# Patient Record
Sex: MALE | Race: WHITE | Employment: UNEMPLOYED | URBAN - METROPOLITAN AREA
[De-identification: names, ages, dates, MRNs, and addresses within clinical notes are randomized per-mention and may not be internally consistent; named-entity substitution may affect disease eponyms.]

---

## 2017-02-10 ENCOUNTER — OFFICE VISIT (OUTPATIENT)
Dept: PEDIATRICS | Facility: CLINIC | Age: 42
End: 2017-02-10
Payer: COMMERCIAL

## 2017-02-10 VITALS
SYSTOLIC BLOOD PRESSURE: 110 MMHG | RESPIRATION RATE: 14 BRPM | WEIGHT: 223.9 LBS | DIASTOLIC BLOOD PRESSURE: 80 MMHG | HEART RATE: 60 BPM | TEMPERATURE: 98 F | HEIGHT: 71 IN | BODY MASS INDEX: 31.35 KG/M2

## 2017-02-10 DIAGNOSIS — K51.819 OTHER ULCERATIVE COLITIS WITH COMPLICATION (H): ICD-10-CM

## 2017-02-10 DIAGNOSIS — S46.811A STRAIN OF RIGHT DELTOID MUSCLE, INITIAL ENCOUNTER: ICD-10-CM

## 2017-02-10 DIAGNOSIS — Z00.00 ENCOUNTER FOR ROUTINE ADULT HEALTH EXAMINATION WITHOUT ABNORMAL FINDINGS: Primary | ICD-10-CM

## 2017-02-10 DIAGNOSIS — F10.21 ALCOHOL DEPENDENCE IN REMISSION (H): ICD-10-CM

## 2017-02-10 DIAGNOSIS — Z13.6 CARDIOVASCULAR SCREENING; LDL GOAL LESS THAN 160: ICD-10-CM

## 2017-02-10 DIAGNOSIS — I10 ESSENTIAL HYPERTENSION: ICD-10-CM

## 2017-02-10 DIAGNOSIS — Z72.0 TOBACCO ABUSE: ICD-10-CM

## 2017-02-10 PROCEDURE — 99396 PREV VISIT EST AGE 40-64: CPT | Performed by: INTERNAL MEDICINE

## 2017-02-10 ASSESSMENT — ANXIETY QUESTIONNAIRES
5. BEING SO RESTLESS THAT IT IS HARD TO SIT STILL: NOT AT ALL
GAD7 TOTAL SCORE: 2
2. NOT BEING ABLE TO STOP OR CONTROL WORRYING: NOT AT ALL
6. BECOMING EASILY ANNOYED OR IRRITABLE: NOT AT ALL
7. FEELING AFRAID AS IF SOMETHING AWFUL MIGHT HAPPEN: NOT AT ALL
IF YOU CHECKED OFF ANY PROBLEMS ON THIS QUESTIONNAIRE, HOW DIFFICULT HAVE THESE PROBLEMS MADE IT FOR YOU TO DO YOUR WORK, TAKE CARE OF THINGS AT HOME, OR GET ALONG WITH OTHER PEOPLE: SOMEWHAT DIFFICULT
1. FEELING NERVOUS, ANXIOUS, OR ON EDGE: SEVERAL DAYS
3. WORRYING TOO MUCH ABOUT DIFFERENT THINGS: SEVERAL DAYS

## 2017-02-10 ASSESSMENT — PATIENT HEALTH QUESTIONNAIRE - PHQ9: 5. POOR APPETITE OR OVEREATING: NOT AT ALL

## 2017-02-10 NOTE — MR AVS SNAPSHOT
After Visit Summary   2/10/2017    Roger Avelar    MRN: [id number]           Patient Information     Date Of Birth          1975        Visit Information        Provider Department      2/10/2017 10:00 AM Sebastián Kat MD [facility]        Today's Diagnoses     Encounter for routine adult health examination without abnormal findings    -  1     Essential hypertension         Other ulcerative colitis with complication (H)         Alcohol dependence in remission (H)         Strain of right deltoid muscle, initial encounter         Tobacco abuse         CARDIOVASCULAR SCREENING; LDL GOAL LESS THAN 160           Care Instructions      Preventive Health Recommendations  Male Ages 40 to 49    Yearly exam:             See your health care provider every year in order to  o   Review health changes.   o   Discuss preventive care.    o   Review your medicines if your doctor has prescribed any.    You should be tested each year for STDs (sexually transmitted diseases) if you re at risk.     Have a cholesterol test every 5 years.     Have a colonoscopy (test for colon cancer) if someone in your family has had colon cancer or polyps before age 50.     After age 45, have a diabetes test (fasting glucose). If you are at risk for diabetes, you should have this test every 3 years.      Talk with your health care provider about whether or not a prostate cancer screening test (PSA) is right for you.    Shots: Get a flu shot each year. Get a tetanus shot every 10 years.     Nutrition:    Eat at least 5 servings of fruits and vegetables daily.     Eat whole-grain bread, whole-wheat pasta and brown rice instead of white grains and rice.     Talk to your provider about Calcium and Vitamin D.     Lifestyle    Exercise for at least 150 minutes a week (30 minutes a day, 5 days a week). This will help you control your weight and prevent disease.     Limit alcohol to one drink per day.     No smoking.  "    Wear sunscreen to prevent skin cancer.     See your dentist every six months for an exam and cleaning.                Follow-ups after your visit        Future tests that were ordered for you today     Open Future Orders        Priority Expected Expires Ordered    Lipid Profile with reflex to direct LDL Routine  12/10/2017 2/10/2017    Comprehensive metabolic panel (BMP + Alb, Alk Phos, ALT, AST, Total. Bili, TP) Routine  12/10/2017 2/10/2017            Who to contact     If you have questions or need follow up information about today's clinic visit or your schedule please contact [facility] directly at [phone].  Normal or non-critical lab and imaging results will be communicated to you by MyChart, letter or phone within 4 business days after the clinic has received the results. If you do not hear from us within 7 days, please contact the clinic through [employer] or phone. If you have a critical or abnormal lab result, we will notify you by phone as soon as possible.  Submit refill requests through [employer] or call your pharmacy and they will forward the refill request to us. Please allow 3 business days for your refill to be completed.          Additional Information About Your Visit        [employer] Information     [employer] lets you send messages to your doctor, view your test results, renew your prescriptions, schedule appointments and more. To sign up, go to [email or web address] . Click on \"Log in\" on the left side of the screen, which will take you to the Welcome page. Then click on \"Sign up Now\" on the right side of the page.     You will be asked to enter the access code listed below, as well as some personal information. Please follow the directions to create your username and password.     Your access code is: [email or web address]  Expires: 2017 10:33 AM     Your access code will  in 90 days. If you need help or a new code, please call your [place] clinic or [phone].        Care " "EveryWhere ID     This is your Care EveryWhere ID. This could be used by other organizations to access your [facility] medical records  [id number]        Your Vitals Were     Pulse Temperature Respirations Height BMI (Body Mass Index)       60 98  F (36.7  C) (Oral) 14 5' 11\" (1.803 m) 31.24 kg/m2        Blood Pressure from Last 3 Encounters:   02/10/17 110/80   04/09/15 139/87   11/26/14 140/82    Weight from Last 3 Encounters:   02/10/17 223 lb 14.4 oz (101.56 kg)   04/09/15 235 lb (106.595 kg)   11/26/14 242 lb (109.77 kg)                 Today's Medication Changes          These changes are accurate as of: 2/10/17 10:33 AM.  If you have any questions, ask your nurse or doctor.               Stop taking these medicines if you haven't already. Please contact your care team if you have questions.     fluticasone 50 MCG/ACT spray   Commonly known as:  FLONASE   Stopped by:  Sebastián Kat MD           LORazepam 1 MG tablet   Commonly known as:  ATIVAN   Stopped by:  Sebastián Kat MD           Multi-vitamin Tabs tablet   Stopped by:  Sebastián Kat MD           OMEGA-3 FISH OIL PO   Stopped by:  Sebastián Kat MD                    Primary Care Provider Office Phone # Fax #    Sebastián Kat [phone]       [facility] [address] [place] [address] [address] [place] [address]        Thank you!     Thank you for choosing [facility]  for your care. Our goal is always to provide you with excellent care. Hearing back from our patients is one way we can continue to improve our services. Please take a few minutes to complete the written survey that you may receive in the mail after your visit with us. Thank you!             Your Updated Medication List - Protect others around you: Learn how to safely use, store and throw away your medicines at www.disposemymeds.org.      Notice  As of 2/10/2017 10:33 AM    You have not been prescribed any medications.      "

## 2017-02-10 NOTE — NURSING NOTE
"Chief Complaint   Patient presents with     Physical       Initial /80 mmHg  Pulse 60  Temp(Src) 98  F (36.7  C) (Oral)  Resp 14  Ht 5' 11\" (1.803 m)  Wt 223 lb 14.4 oz (101.56 kg)  BMI 31.24 kg/m2 Estimated body mass index is 31.24 kg/(m^2) as calculated from the following:    Height as of this encounter: 5' 11\" (1.803 m).    Weight as of this encounter: 223 lb 14.4 oz (101.56 kg).  Medication Reconciliation: complete   Juanita ASH, CMA,AAMA      "

## 2017-02-10 NOTE — PATIENT INSTRUCTIONS
Preventive Health Recommendations  Male Ages 40 to 49    Yearly exam:             See your health care provider every year in order to  o   Review health changes.   o   Discuss preventive care.    o   Review your medicines if your doctor has prescribed any.    You should be tested each year for STDs (sexually transmitted diseases) if you re at risk.     Have a cholesterol test every 5 years.     Have a colonoscopy (test for colon cancer) if someone in your family has had colon cancer or polyps before age 50.     After age 45, have a diabetes test (fasting glucose). If you are at risk for diabetes, you should have this test every 3 years.      Talk with your health care provider about whether or not a prostate cancer screening test (PSA) is right for you.    Shots: Get a flu shot each year. Get a tetanus shot every 10 years.     Nutrition:    Eat at least 5 servings of fruits and vegetables daily.     Eat whole-grain bread, whole-wheat pasta and brown rice instead of white grains and rice.     Talk to your provider about Calcium and Vitamin D.     Lifestyle    Exercise for at least 150 minutes a week (30 minutes a day, 5 days a week). This will help you control your weight and prevent disease.     Limit alcohol to one drink per day.     No smoking.     Wear sunscreen to prevent skin cancer.     See your dentist every six months for an exam and cleaning.

## 2017-02-10 NOTE — PROGRESS NOTES
SUBJECTIVE:     CC: Roger Avelar is an 41 year old male who presents for preventative health visit.     Healthy Habits:    Do you get at least three servings of calcium containing foods daily (dairy, green leafy vegetables, etc.)? yes    Amount of exercise or daily activities, outside of work: 5-7 day(s) per week    Problems taking medications regularly not applicable    Medication side effects: Not applicable    Have you had an eye exam in the past two years? yes    Do you see a dentist twice per year? yes    Do you have sleep apnea, excessive snoring or daytime drowsiness?no      patient here for follow-up, doing very well. has been sober for > 2 years, feels great and has lost a lot of weight. feels better than he has in a long time. has some shoulder pain, alexia with certain activities at the gym. no injury he has noted. Ulcerative colitis is stable, does have GI follow-up annually. Pt also quit smoking!    Today's PHQ-2 Score: 0  PHQ-2 ( 1999 Pf[employer]) 7/9/2014 3/17/2014   Q1: Little interest or pleasure in doing things 0 0   Q2: Feeling down, depressed or hopeless 0 0   PHQ-2 Score 0 0       Abuse: Current or Past(Physical, Sexual or Emotional)- No  Do you feel safe in your environment - Yes    Social History   Substance Use Topics     Smoking status: Light Tobacco Smoker -- 0.50 packs/day     Types: Cigarettes     Smokeless tobacco: Never Used      Comment: 4 cigs a day     Alcohol Use: Yes      Comment: 8 drinks daily     The patient does not drink >3 drinks per day nor >7 drinks per week.    Last PSA: No results found for: PSA    Recent Labs   Lab Test  09/09/13   1117   CHOL  147   HDL  68   LDL  45   TRIG  169*   CHOLHDLRATIO  2.2       Reviewed orders with patient. Reviewed health maintenance and updated orders accordingly - Yes    All Histories reviewed and updated in Epic.      ROS:  C: NEGATIVE for fever, chills, change in weight  I: NEGATIVE for worrisome rashes, moles or lesions  E: NEGATIVE for  "vision changes or irritation  ENT: NEGATIVE for ear, mouth and throat problems  R: NEGATIVE for significant cough or SOB  CV: NEGATIVE for chest pain, palpitations or peripheral edema  GI: NEGATIVE for nausea, abdominal pain, heartburn, or change in bowel habits   male: negative for dysuria, hematuria, decreased urinary stream, erectile dysfunction, urethral discharge  M: NEGATIVE for significant arthralgias or myalgia  N: NEGATIVE for weakness, dizziness or paresthesias  P: NEGATIVE for changes in mood or affect    Problem list, Medication list, Allergies, and Medical/Social/Surgical histories reviewed in [facility] and updated as appropriate.  OBJECTIVE:     /80 mmHg  Pulse 60  Temp(Src) 98  F (36.7  C) (Oral)  Resp 14  Ht 5' 11\" (1.803 m)  Wt 223 lb 14.4 oz (101.56 kg)  BMI 31.24 kg/m2     Wt Readings from Last 4 Encounters:   02/10/17 223 lb 14.4 oz (101.6 kg)   04/09/15 235 lb (106.6 kg)   11/26/14 242 lb (109.8 kg)   07/21/14 240 lb (108.9 kg)       EXAM:  GENERAL: healthy, alert and no distress  EYES: Eyes grossly normal to inspection, PERRL and conjunctivae and sclerae normal  HENT: ear canals and TM's normal, nose and mouth without ulcers or lesions  NECK: no adenopathy, no asymmetry, masses, or scars and thyroid normal to palpation  RESP: lungs clear to auscultation - no rales, rhonchi or wheezes  CV: regular rate and rhythm, normal S1 S2, no S3 or S4, no murmur, click or rub, no peripheral edema and peripheral pulses strong  ABDOMEN: soft, nontender, no hepatosplenomegaly, no masses and bowel sounds normal  MS: + anterior deltaid pain with SITS mm testing. full range of motion. normal strength.   SKIN: no suspicious lesions or rashes  NEURO: Normal strength and tone, mentation intact and speech normal  PSYCH: mentation appears normal, affect normal/bright    ASSESSMENT/PLAN:     1. Encounter for routine adult health examination without abnormal findings  d/w pt preventative care measures including " seat belt use, bike helmet, moderation of EtOH, avoiding tobacco, avoiding excessive sun exposure/sunscreen, wt management or wt loss if BMI > 30, need to screen for lipid disorders, mood disorders, CAD risk factors, etc. Also discussed accident prevention and future RHM schedule.   - Lipid Profile with reflex to direct LDL; Future    2. Essential hypertension  discussed with patient (or patient's parents/caregiver) pathophysiology of condition and treatment options.  reviewed labs, medications, diet ,etc.    - Lipid Profile with reflex to direct LDL; Future  - Comprehensive metabolic panel (BMP + Alb, Alk Phos, ALT, AST, Total. Bili, TP); Future    3. Other ulcerative colitis with complication (H)  Well controlled on current medication(s). discussed with patient (or patient's parents/caregiver) pathophysiology of condition and treatment options.  reviewed labs, medications, diet ,etc.      4. Alcohol dependence in remission (H)  discussed with patient (or patient's parents/caregiver) pathophysiology of condition and treatment options.  congrats on sobriety. reviwed 1`2 step mtgs, mindfulness, etc.   - Comprehensive metabolic panel (BMP + Alb, Alk Phos, ALT, AST, Total. Bili, TP); Future    5. Strain of right deltoid muscle, initial encounter  Rest the affected painful area as much as possible. Avoid aggravating activities. Apply ice for 15-20 minutes intermittently as needed and especially after any offending activity. Ibuprophen at anti-inflammatory dose x 3 days with food, then prn. Daily stretching.  As pain recedes, begin normal activities slowly as tolerated.  Consider Physical Therapy if symptoms not better with symptomatic care. Patient verbalized understanding and is agreeable to this plan.     6. Tobacco abuse  congrats on cessation!    7. CARDIOVASCULAR SCREENING; LDL GOAL LESS THAN 160  - Lipid Profile with reflex to direct LDL; Future    COUNSELING:  Reviewed preventive health counseling, as reflected  "in patient instructions  Special attention given to:        Regular exercise       Healthy diet/nutrition       Vision screening       Hearing screening       Colon cancer screening       Prostate cancer screening         reports that he has been smoking Cigarettes.  He has been smoking about 0.50 packs per day. He has never used smokeless tobacco.    Estimated body mass index is 31.24 kg/(m^2) as calculated from the following:    Height as of this encounter: 5' 11\" (1.803 m).    Weight as of this encounter: 223 lb 14.4 oz (101.56 kg).       Counseling Resources:  ATP IV Guidelines  Pooled Cohorts Equation Calculator  FRAX Risk Assessment  ICSI Preventive Guidelines  Dietary Guidelines for Americans, 2010  Epoch's MyPlate  ASA Prophylaxis  Lung CA Screening    I have discussed with patient the risks, benefits, medications, treatment options and modalities.   I have instructed the patient to call or schedule a follow-up appointment if any problems or failure to improve.       Sebastián Kat MD  [facility]  "

## 2017-02-11 ASSESSMENT — ANXIETY QUESTIONNAIRES: GAD7 TOTAL SCORE: 2

## 2017-02-11 ASSESSMENT — PATIENT HEALTH QUESTIONNAIRE - PHQ9: SUM OF ALL RESPONSES TO PHQ QUESTIONS 1-9: 0

## 2017-02-14 DIAGNOSIS — Z00.00 ENCOUNTER FOR ROUTINE ADULT HEALTH EXAMINATION WITHOUT ABNORMAL FINDINGS: ICD-10-CM

## 2017-02-14 DIAGNOSIS — Z13.6 CARDIOVASCULAR SCREENING; LDL GOAL LESS THAN 160: ICD-10-CM

## 2017-02-14 DIAGNOSIS — I10 ESSENTIAL HYPERTENSION: ICD-10-CM

## 2017-02-14 DIAGNOSIS — F10.21 ALCOHOL DEPENDENCE IN REMISSION (H): ICD-10-CM

## 2017-02-14 LAB
ALBUMIN SERPL-MCNC: 3.7 G/DL (ref 3.4–5)
ALP SERPL-CCNC: 84 U/L (ref 40–150)
ALT SERPL W P-5'-P-CCNC: 236 U/L (ref 0–70)
ANION GAP SERPL CALCULATED.3IONS-SCNC: 6 MMOL/L (ref 3–14)
AST SERPL W P-5'-P-CCNC: 88 U/L (ref 0–45)
BILIRUB SERPL-MCNC: 0.5 MG/DL (ref 0.2–1.3)
BUN SERPL-MCNC: 17 MG/DL (ref 7–30)
CALCIUM SERPL-MCNC: 8.7 MG/DL (ref 8.5–10.1)
CHLORIDE SERPL-SCNC: 106 MMOL/L (ref 94–109)
CHOLEST SERPL-MCNC: 173 MG/DL
CO2 SERPL-SCNC: 28 MMOL/L (ref 20–32)
CREAT SERPL-MCNC: 0.9 MG/DL (ref 0.66–1.25)
GFR SERPL CREATININE-BSD FRML MDRD: ABNORMAL ML/MIN/1.7M2
GLUCOSE SERPL-MCNC: 98 MG/DL (ref 70–99)
HDLC SERPL-MCNC: 87 MG/DL
LDLC SERPL CALC-MCNC: 70 MG/DL
NONHDLC SERPL-MCNC: 86 MG/DL
POTASSIUM SERPL-SCNC: 4.2 MMOL/L (ref 3.4–5.3)
PROT SERPL-MCNC: 7 G/DL (ref 6.8–8.8)
SODIUM SERPL-SCNC: 140 MMOL/L (ref 133–144)
TRIGL SERPL-MCNC: 79 MG/DL

## 2017-02-14 PROCEDURE — 80061 LIPID PANEL: CPT | Performed by: INTERNAL MEDICINE

## 2017-02-14 PROCEDURE — 80053 COMPREHEN METABOLIC PANEL: CPT | Performed by: INTERNAL MEDICINE

## 2017-02-14 PROCEDURE — 36415 COLL VENOUS BLD VENIPUNCTURE: CPT | Performed by: INTERNAL MEDICINE

## 2018-11-15 ENCOUNTER — OFFICE VISIT (OUTPATIENT)
Dept: PEDIATRICS | Facility: CLINIC | Age: 43
End: 2018-11-15
Payer: COMMERCIAL

## 2018-11-15 VITALS
OXYGEN SATURATION: 99 % | SYSTOLIC BLOOD PRESSURE: 114 MMHG | DIASTOLIC BLOOD PRESSURE: 74 MMHG | WEIGHT: 258 LBS | HEART RATE: 60 BPM | HEIGHT: 71 IN | BODY MASS INDEX: 36.12 KG/M2 | TEMPERATURE: 98.4 F

## 2018-11-15 DIAGNOSIS — D17.30 LIPOMA OF SKIN AND SUBCUTANEOUS TISSUE: ICD-10-CM

## 2018-11-15 DIAGNOSIS — M54.50 ACUTE BILATERAL LOW BACK PAIN WITHOUT SCIATICA: Primary | ICD-10-CM

## 2018-11-15 PROCEDURE — 99213 OFFICE O/P EST LOW 20 MIN: CPT | Mod: GE | Performed by: STUDENT IN AN ORGANIZED HEALTH CARE EDUCATION/TRAINING PROGRAM

## 2018-11-15 RX ORDER — CYCLOBENZAPRINE HCL 10 MG
5-10 TABLET ORAL 3 TIMES DAILY PRN
Qty: 30 TABLET | Refills: 1 | Status: SHIPPED | OUTPATIENT
Start: 2018-11-15 | End: 2019-02-14

## 2018-11-15 NOTE — MR AVS SNAPSHOT
After Visit Summary   11/15/2018    Roger Avelar    MRN: [id number]           Patient Information     Date Of Birth          1975        Visit Information        Provider Department      11/15/2018 8:00 AM Toni Hopkins MD [facility]        Today's Diagnoses     Acute bilateral low back pain without sciatica    -  1    Lipoma of skin and subcutaneous tissue          Care Instructions    As we discussed, you most likely suffered a strain sprain of the lower back.  You have no concerning signs or symptoms which is excellent.  I recommend continuing to be as active as possible/tolerated, apply warm packs/ic to lower back, stretch throughout the day, and take ibuprofen 600 mg with food or Tylenol 650 mg every 6 hours for pain.  I have also written you for flexeril 5-10 mg to be taken at night to help with sleep; do not take during the day if you plan to drive or operate heavy machinery.  I have also written a referral for PT.  With respect to back it is possible lipoma or simple sebaceous cyst and have made a dermatology referral for removal.  Please call with questions or concerns!    Toni Hopkins MD          Follow-ups after your visit        Additional Services     DERMATOLOGY REFERRAL       Your provider has referred you to: FMG: [facility] [phone]   [email or web address]  FM: [facility] [phone]  [facility]: [facility] [place] [phone]   [email or web address]  [place] [phone]   [email or web address]  N: [facility] [place] [phone]   [email or web address]  FHN: [facility] [place] [phone]   [email or web address]    Please be aware that coverage of these services is subject to the terms and limitations of your health insurance plan.  Call member  services at your health plan with any benefit or coverage questions.      Please bring the following with you to your appointment:    (1) Any X-Rays, CTs or MRIs which have been performed.  Contact the facility where they were done to arrange for  prior to your scheduled appointment.    (2) List of current medications  (3) This referral request   (4) Any documents/labs given to you for this referral            [facility] PT, HAND, AND CHIROPRACTIC REFERRAL       Physical Therapy, Hand Therapy and Chiropractic Care are available through:  *[facility]  *Hand Therapy (Occupational Therapy or Physical Therapy)  *[facility]    Call one number to schedule at any of the above locations: [phone].    Physical therapy, Hand therapy and/or Chiropractic care has been recommended by your physician as an excellent treatment option to reduce pain and help people return to normal activities, including sports.  Therapy and/or chiropractic care services are a great complement or alternative to expensive and invasive surgery, injections, or long-term use of prescription medications. The primary goal is to identify the underlying problem and provide you the tools to manage your condition on your own.     Please be aware that coverage of these services is subject to the terms and limitations of your health insurance plan.  Call member services at your health plan with any benefit or coverage questions.      Please bring the following to your appointment:  *Your personal calendar for scheduling future appointments  *Comfortable clothing                  Follow-up notes from your care team     Return in about 10 days (around 11/25/2018), or if symptoms worsen or fail to improve, for Routine Visit.      Future tests that were ordered for you today     Open Future Orders        Priority Expected Expires Ordered    [facility] PT, HAND, AND CHIROPRACTIC REFERRAL Routine  11/15/2019 11/15/2018     "        Who to contact     If you have questions or need follow up information about today's clinic visit or your schedule please contact [facility] directly at [phone].  Normal or non-critical lab and imaging results will be communicated to you by MyChart, letter or phone within 4 business days after the clinic has received the results. If you do not hear from us within 7 days, please contact the clinic through MyChart or phone. If you have a critical or abnormal lab result, we will notify you by phone as soon as possible.  Submit refill requests through [employer] or call your pharmacy and they will forward the refill request to us. Please allow 3 business days for your refill to be completed.          Additional Information About Your Visit        [employer] Information     [employer] lets you send messages to your doctor, view your test results, renew your prescriptions, schedule appointments and more. To sign up, go to [email or web address] . Click on \"Log in\" on the left side of the screen, which will take you to the Welcome page. Then click on \"Sign up Now\" on the right side of the page.     You will be asked to enter the access code listed below, as well as some personal information. Please follow the directions to create your username and password.     Your access code is: [email or web address]  Expires: 2019  8:20 AM     Your access code will  in 90 days. If you need help or a new code, please call your [facility] clinic or [phone].        Care EveryWhere ID     This is your Care EveryWhere ID. This could be used by other organizations to access your [facility] medical records  [id number]        Your Vitals Were     Pulse Temperature Height Pulse Oximetry BMI (Body Mass Index)       60 98.4  F (36.9  C) (Oral) 5' 11\" (1.803 m) 99% 35.98 kg/m2        Blood Pressure from Last 3 Encounters:   11/15/18 114/74   02/10/17 110/80   04/09/15 139/87    Weight from Last 3 Encounters:   11/15/18 258 lb " (117 kg)   02/10/17 223 lb 14.4 oz (101.6 kg)   04/09/15 235 lb (106.6 kg)              We Performed the Following     DERMATOLOGY REFERRAL          Today's Medication Changes          These changes are accurate as of 11/15/18  8:20 AM.  If you have any questions, ask your nurse or doctor.               Start taking these medicines.        Dose/Directions    cyclobenzaprine 10 MG tablet   Commonly known as:  FLEXERIL   Used for:  Acute bilateral low back pain without sciatica   Started by:  Toni Hopkins MD        Dose:  5-10 mg   Take 0.5-1 tablets (5-10 mg) by mouth 3 times daily as needed for muscle spasms   Quantity:  30 tablet   Refills:  1            Where to get your medicines      These medications were sent to [facility] - [address]   [address], [place] [address]     Phone:  [phone]     cyclobenzaprine 10 MG tablet                Primary Care Provider Office Phone # Fax #    Sebastián Kat [phone]       [address] [place] [address]        Equal Access to Services     [facility]: Hadii angelita ku hadasho Soliz, waaxda luqadaha, qaybta kaalmada yung, laina richards . So [facility] [phone].    ATENCIÓN: Si habla español, tiene a traylor disposición servicios gratuitos de asistencia lingüística. [facility] [phone].    We comply with applicable federal civil rights laws and [place] laws. We do not discriminate on the basis of race, color, national origin, age, disability, sex, sexual orientation, or gender identity.            Thank you!     Thank you for choosing [facility]  for your care. Our goal is always to provide you with excellent care. Hearing back from our patients is one way we can continue to improve our services. Please take a few minutes to complete the written survey that you may receive in the mail after your visit with us.  Thank you!             Your Updated Medication List - Protect others around you: Learn how to safely use, store and throw away your medicines at www.disposemymeds.org.          This list is accurate as of 11/15/18  8:20 AM.  Always use your most recent med list.                   Brand Name Dispense Instructions for use Diagnosis    cyclobenzaprine 10 MG tablet    FLEXERIL    30 tablet    Take 0.5-1 tablets (5-10 mg) by mouth 3 times daily as needed for muscle spasms    Acute bilateral low back pain without sciatica

## 2018-11-15 NOTE — PROGRESS NOTES
SUBJECTIVE:   Roger Avelar is a 43 year old male who presents to clinic today for the following health issues:      Back Pain       Duration: 1.5 weeks        Specific cause: none    Description:   Location of pain: low back bilateral  Character of pain: dull ache and stabbing  Pain radiation:none  New numbness or weakness in legs, not attributed to pain:  no     Intensity: moderate    History:   Pain interferes with job: No  History of back problems: no prior back problems  Any previous MRI or X-rays: None  Sees a specialist for back pain:  No  Therapies tried without relief: NSAIDs    Alleviating factors:   Improved by: stretch      Precipitating factors:  Worsened by: Lifting    He is a  by trade.  He bends and pulls stuff throughout the day.  No pop.  No recent or remote trauma to the lower back.   He has stretched throughout the day with improvement however stiffens up overnight.  It has steadily had improvement throughout this past 1.5 weeks.  He has applied heat and icy hot with minimal improvement.        Accompanying Signs & Symptoms:  Risk of Fracture:  None  Risk of Cauda Equina:  None  Risk of Infection:  None  Risk of Cancer:  None  Risk of Ankylosing Spondylitis:  Onset at age <35, male, AND morning back stiffness. no     Problem list and histories reviewed & adjusted, as indicated.  Additional history: as documented    Patient Active Problem List   Diagnosis     CARDIOVASCULAR SCREENING; LDL GOAL LESS THAN 160     UC (ulcerative colitis) (H)     Chronic knee pain     Tobacco abuse     HTN (hypertension)     Past Surgical History:   Procedure Laterality Date     HERNIA REPAIR  1984       Social History   Substance Use Topics     Smoking status: Light Tobacco Smoker     Packs/day: 0.50     Types: Cigarettes     Smokeless tobacco: Never Used      Comment: 4 cigs a day     Alcohol use Yes      Comment: 8 drinks daily     Family History   Problem Relation Age of Onset     Hypertension  "Father      Cancer Father      Circulatory Mother      varicose veins         No current outpatient prescriptions on file.     Allergies   Allergen Reactions     Penicillins Rash     BP Readings from Last 3 Encounters:   11/15/18 114/74   02/10/17 110/80   04/09/15 139/87    Wt Readings from Last 3 Encounters:   11/15/18 258 lb (117 kg)   02/10/17 223 lb 14.4 oz (101.6 kg)   04/09/15 235 lb (106.6 kg)          Reviewed and updated as needed this visit by clinical staff  Tobacco       Reviewed and updated as needed this visit by Provider         ROS:  A focused ROS was obtained and documented for notable findings in the HPI as stated above.    OBJECTIVE:     /74 (Cuff Size: Adult Large)  Pulse 60  Temp 98.4  F (36.9  C) (Oral)  Ht 5' 11\" (1.803 m)  Wt 258 lb (117 kg)  SpO2 99%  BMI 35.98 kg/m2  Body mass index is 35.98 kg/(m^2).  GENERAL: healthy, alert and no distress  EYES: Eyes grossly normal to inspection, PERRL and conjunctivae and sclerae normal  NECK: no adenopathy, no asymmetry, masses, or scars and thyroid normal to palpation  RESP: lungs clear to auscultation - no rales, rhonchi or wheezes  CV: regular rate and rhythm, normal S1 S2, no S3 or S4, no murmur, click or rub, no peripheral edema and peripheral pulses strong  MS: No lower extremity edema.  Able to ambulate without issue and get up from seated position.    SKIN: Approximately 2.5 cm in diameter sub-cutaenous soft mobile lesion near left scapular border.  No tenderness to palpation, induration, or warmth associated. no suspicious lesions or rashes  NEURO: Normal strength and tone, mentation intact and speech normal.  Comprehensive back pain exam:  Tenderness of L4 paraspinal muscles, Range of motion not limited by pain, Lower extremity strength functional and equal on both sides, Lower extremity reflexes within normal limits bilaterally and Lower extremity sensation normal and equal on both sides    Diagnostic Test Results:  none "     ASSESSMENT/PLAN:   1. Acute bilateral low back pain without sciatica  - activity as tolerated, low back exercises for stretching, warm/cold applied, and PRN ibuprofen or Tylenol for pain relief  - cyclobenzaprine (FLEXERIL) 10 MG tablet; Take 0.5-1 tablets (5-10 mg) by mouth 3 times daily as needed for muscle spasms  Dispense: 30 tablet; Refill: 1  - [facility] PT, HAND, AND CHIROPRACTIC REFERRAL; Future    2. Lipoma of skin and subcutaneous tissue  Most likely a sebaceous cyst vs lipoma.  No signs of infection.  Prior history in different locale.  Cosmetic appearance troubling and would like removed.  - DERMATOLOGY REFERRAL    Medications discussed  See Patient Instructions    The patient was discussed with Dr. Ortiz, the attending, who agrees with the plan as stated above.    Toni Hopkins MD  [facility]    ===========  STAFF NOTE:  Patient discussed with resident physician today.  We discussed sagastume portions of the visit and I participated in the evaluation and management of the patient today.     Jose Ortiz MD

## 2018-11-15 NOTE — PATIENT INSTRUCTIONS
As we discussed, you most likely suffered a strain sprain of the lower back.  You have no concerning signs or symptoms which is excellent.  I recommend continuing to be as active as possible/tolerated, apply warm packs/ic to lower back, stretch throughout the day, and take ibuprofen 600 mg with food or Tylenol 650 mg every 6 hours for pain.  I have also written you for flexeril 5-10 mg to be taken at night to help with sleep; do not take during the day if you plan to drive or operate heavy machinery.  I have also written a referral for PT.  With respect to back it is possible lipoma or simple sebaceous cyst and have made a dermatology referral for removal.  Please call with questions or concerns!    Toni Hopkins MD

## 2019-02-14 ENCOUNTER — PATIENT OUTREACH (OUTPATIENT)
Dept: CARE COORDINATION | Facility: CLINIC | Age: 44
End: 2019-02-14

## 2019-02-14 ENCOUNTER — OFFICE VISIT (OUTPATIENT)
Dept: PEDIATRICS | Facility: CLINIC | Age: 44
End: 2019-02-14
Payer: COMMERCIAL

## 2019-02-14 VITALS
HEIGHT: 71 IN | TEMPERATURE: 97.6 F | HEART RATE: 69 BPM | WEIGHT: 250 LBS | BODY MASS INDEX: 35 KG/M2 | SYSTOLIC BLOOD PRESSURE: 134 MMHG | DIASTOLIC BLOOD PRESSURE: 98 MMHG | OXYGEN SATURATION: 95 %

## 2019-02-14 DIAGNOSIS — R03.0 ELEVATED BLOOD PRESSURE READING WITHOUT DIAGNOSIS OF HYPERTENSION: ICD-10-CM

## 2019-02-14 DIAGNOSIS — F41.9 ANXIETY: Primary | ICD-10-CM

## 2019-02-14 DIAGNOSIS — F39 MOOD DISORDER (H): ICD-10-CM

## 2019-02-14 DIAGNOSIS — F10.11 HISTORY OF ALCOHOL ABUSE: ICD-10-CM

## 2019-02-14 PROCEDURE — 99214 OFFICE O/P EST MOD 30 MIN: CPT | Performed by: INTERNAL MEDICINE

## 2019-02-14 RX ORDER — HYDROXYZINE HYDROCHLORIDE 25 MG/1
25-50 TABLET, FILM COATED ORAL
COMMUNITY
Start: 2019-02-12 | End: 2019-03-13

## 2019-02-14 RX ORDER — ESCITALOPRAM OXALATE 10 MG/1
10 TABLET ORAL DAILY
Qty: 30 TABLET | Refills: 3 | Status: SHIPPED | OUTPATIENT
Start: 2019-02-14 | End: 2019-06-13

## 2019-02-14 ASSESSMENT — ANXIETY QUESTIONNAIRES
2. NOT BEING ABLE TO STOP OR CONTROL WORRYING: MORE THAN HALF THE DAYS
5. BEING SO RESTLESS THAT IT IS HARD TO SIT STILL: MORE THAN HALF THE DAYS
GAD7 TOTAL SCORE: 13
1. FEELING NERVOUS, ANXIOUS, OR ON EDGE: MORE THAN HALF THE DAYS
6. BECOMING EASILY ANNOYED OR IRRITABLE: MORE THAN HALF THE DAYS
3. WORRYING TOO MUCH ABOUT DIFFERENT THINGS: MORE THAN HALF THE DAYS
IF YOU CHECKED OFF ANY PROBLEMS ON THIS QUESTIONNAIRE, HOW DIFFICULT HAVE THESE PROBLEMS MADE IT FOR YOU TO DO YOUR WORK, TAKE CARE OF THINGS AT HOME, OR GET ALONG WITH OTHER PEOPLE: SOMEWHAT DIFFICULT
7. FEELING AFRAID AS IF SOMETHING AWFUL MIGHT HAPPEN: SEVERAL DAYS

## 2019-02-14 ASSESSMENT — PATIENT HEALTH QUESTIONNAIRE - PHQ9
5. POOR APPETITE OR OVEREATING: MORE THAN HALF THE DAYS
SUM OF ALL RESPONSES TO PHQ QUESTIONS 1-9: 7

## 2019-02-14 ASSESSMENT — ACTIVITIES OF DAILY LIVING (ADL): DEPENDENT_IADLS:: INDEPENDENT

## 2019-02-14 ASSESSMENT — MIFFLIN-ST. JEOR: SCORE: 2051.12

## 2019-02-14 NOTE — PROGRESS NOTES
Clinic Care Coordination Contact  OUTREACH    Referral Information: Assistance with locating MH/CD support    Chief Complaint   Patient presents with     Clinic Care Coordination - Initial     Behavioral Health       [place] Utilization: No major concerns noted.  Utilization    Last refreshed: 2/13/2019 12:21 PM:  Hospital Admissions 0           Last refreshed: 2/13/2019 12:21 PM:  ED Visits 0           Last refreshed: 2/13/2019 12:21 PM:  No Show Count (past year) 0              Current as of: 2/13/2019 12:21 PM            Clinical Concerns:  Current Medical Concerns:  Pt recently hospitalized for Alcohol abuse w/suicidal ideation and was discharged to home with instructions to establish as an outpatient with behavioral healthcare providers for ongoing treatment of severe anxiety. Pt following up in clinic today for an appointment. ARIELLA MARKHAM met with pt to discuss options.    Medication Management:  Pt was started on 10 mg of Lexapro today during visit. Pt was encouraged to meet with a psychiatrist to explore alternatives as well.      Functional Status:  Dependent ADLs:: Independent- Pt is working full time. Pt's boss recently had knee replacement surgery and will be out for 6-8 weeks. Due to lack of staffing pt's work hours has significantly increased to 6 (M-S) days a week 12 hours a day. Discussed creating strong boundaries and having tough conversations about work ability and the importance of work life balance.     Living Situation:  Current living arrangement:: I live in a private home with spouse  Type of residence:: Private home - stairs     Psychosocial:  Protestant or spiritual beliefs that impact treatment:: No  Mental health DX:: Yes  Current Behavioral Concerns: Relapse with alcohol use- previously sober for 2 years  Education Provided to patient:  ARIELLA MARKHAM met with pt and explained options for continued recovery would/could include: residential treatment facility, intensive outpatient program, partial  hospitalization program; individual counseling, as well as community support groups, such as Alcoholics Anonymous.  Pt feels confident with previous AA meetings and his biggest concerns are related to anxiety management.   Discussed various ways to get connected with outpatient counseling. Identified [facility] as preferred provider due to proximity to the patient's home and late/flexible hours. ARIELLA CC will make a referral to [facility] for outpatient counseling and medication management services.    Clinical Pathway: Clinic Care Coordination Anxiety Assessment  Day of hospital discharge: 2.12.19  Symptoms: Feeling Nervous, anxious, or on edge, not being able to stop controlling worry, worrying too much, trouble relaxing, being so restless its hard to sit still, feeling afraid something awful will happen   Currently being treated or treated in past for your anxiety?: No  Based on results of your GAD7 you may be experiencing anxiety: Yes  Overall your anxiety symptoms are: Stable  Clinic Care Coordinator - Depression Initial Assessment  Pt identified current zone as: Green  Pt identifies current symptoms of: fatigue, lack of sleep, feelings of hopelessness and helplessness and feeling down, restless    Most recent PHQ-9 score:   PHQ-9 SCORE 2/14/2019   PHQ-9 Total Score 7   Informal Support system:: Spouse, Other  Financial/Insurance: No financial concerns noted.     Resources and Interventions:  Current Resources: AA meetings     Goals:   Goals        General    Mental Health Management (pt-stated)     Notes - Note created  2/14/2019 11:34 AM by Clarita Arce LSW    Goal Statement: I would like to find a counselor to help me manage my anxiety symptoms.  Measure of Success: Pt to meet with a counselor x2   Supportive Steps to Achieve: ARIELLA CC will make a referral to [facility] for counseling. Pt to schedule apt.   Barriers: time constraints due to work hours  Strengths: NA has openings until  9pm  Date to Achieve By: 4.2018  Patient expressed understanding of goal: Yes              Patient/Caregiver understanding: Pt reports understanding and denies any additional questions or concerns at this times. ARIELLA CC engaged in AIDET communication during encounter.    Outreach Frequency: monthly    Future Appointments              In 1 week Savi Reilly MD [facility] [place], [place]    In 3 weeks Delfino Salgado MD [facility] [place], [place]    In 2 months Shireen Elizabeth, [facility] [place]    In 2 months Shireen Elizabeth, [facility]        Plan: ARIELLA CC will place referral to [facility] to establish care at the [place] location. Pt will schedule an appointment. ARIELLA CC will follow-up with pt at next follow-up appointment, 3.13.    ELYSIA Cardoza  Clinic Care Coordinator  [phone]  [email or web address]

## 2019-02-14 NOTE — PROGRESS NOTES
"  SUBJECTIVE:   Roger Avelar is a 43 year old male who presents to clinic today for the following health issues:    ED/UC Followup:    Facility:  [facility]   Date of visit: 2/11/2019  Reason for visit:   Suicidal     ALCOHOL INTOXICATION      Current Status: Anxiety is very bad, not sleeping well, gets highs and lows and feels like he is on a roller coaster.      ED Visit to [facility] 2/11/19  Past Psychiatric History: No past history of psychiatric care, hospitalizations, or medication. No CM or psychotherapy. Reports life-long problem with anxiety, particularly when under work or interpersonal stress.     Patient has a history of alcohol misuse dating from his teen years. Several DUIs with extensive USP time. Chronic problem with alcohol induced GI distress. No history of drug abuse.    Social History: Patient finished HS as average student and \"about 2\" years of college without degree. Has worked as  consistently throughout adult life with occasional breaks with drinking and/or USP.     Diagnostic Impression:  Suicidal ideation  Alcohol use disorder  Alcohol induced mood disorder  R/O dysthymia    Assessment: Patient with chronic problem with periodic alcohol abuse and anxiety who experiences suicidal thinking both when intoxicated and sober. Current admission associated with an alcohol relapse with suicidal thoughts. Describes drinking to calm anxiety but the extent of his drinking is limited by GI problems.     Recommendations: Patient appears safe to be released into the care of his wife. Efforts are made to arrange for follow up care as indicated below:    ADDENDUM 2/12/2019 10:00 Patient is able to contract for safety. He is motivated to seek outpatient care from Psychiatry. He believes mental health is his primary problem. Patients wife is willing for him to return home as long as there is an outpatient plan. She is worried about stress patient will experience being responsible for the " "[employer] where he works while his boss is in CD tx. Patient does have a sponsor he intends to call. He does have AA meetings he can participate in. Wife states she has gone to one [place] meeting and believes she has a better understanding of alcoholism. She wants to support patient in pursuing resources for anxiety.  Plan: home with wife--resources for outpatient care given As well as support resources.  Leslie Overtonbret VILLEGASSW    Didn't sleep Friday night or Saturday night (1-2 hours). Relapsed and started drinking again Friday night. Would come down from the drinking and would feel like crap. Started hallucinating. Has had a lot of deaths I his life - thought he could hear these people telling him negative thoughts (\"you're a dumb shit\".... \"not the come join me I'm dead kind\"). Family asked if he was okay getting checked out. Went to [facility].     # history of EtOH abuse  - Was previously sober for 2.5 years.   - Started drinking again last Friday to deal with anxiety.  - has been through rehab 2x  - already reconnected with AA, has a new sponsor.   - has not had any EtOH since discharge    # Anxiety  PHQ-9 SCORE 2/10/2017 2/14/2019   PHQ-9 Total Score 0 7     ALEJANDRO-7 SCORE 2/10/2017 2/14/2019   Total Score 2 13     When anxiety gets really high nothing can calm him down. Then he wants to drink (doesn't like the taste). Then the drinking just makes everything 10x worse. Can't sleep in the middle of the night. The only way to make it better is to drink    Has never been treated for anxiety before. Used to think it was normal, now realizing it's not.     Has highs and lows. The fact that the has a project with motorcycle drives him crazy. 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     Last Friday started drinking again. Was sober for 2.5 years. Has not had a drink since he left the hospital. Has recconneted with AA- has a new sponsor.    Wife - does talk to her some about mental health. She " "doesn't quite fully understand.    Feels like he can't take time off for an intensive op program.     Effexor killed his sex drive.     Problem list and histories reviewed & adjusted, as indicated.  Additional history: as documented    Patient Active Problem List   Diagnosis     CARDIOVASCULAR SCREENING; LDL GOAL LESS THAN 160     UC (ulcerative colitis) (H)     Chronic knee pain     Tobacco abuse     HTN (hypertension)     History of alcohol abuse     Anxiety     Mood disorder (H)     Past Surgical History:   Procedure Laterality Date     HERNIA REPAIR  1984       Social History     Tobacco Use     Smoking status: Light Tobacco Smoker     Packs/day: 0.50     Types: Cigarettes     Smokeless tobacco: Never Used     Tobacco comment: 4 cigs a day   Substance Use Topics     Alcohol use: Yes     Comment: 8 drinks daily     Family History   Problem Relation Age of Onset     Hypertension Father      Cancer Father      Circulatory Mother         varicose veins         Current Outpatient Medications   Medication Sig Dispense Refill     escitalopram (LEXAPRO) 10 MG tablet Take 1 tablet (10 mg) by mouth daily 30 tablet 3     hydrOXYzine (ATARAX) 25 MG tablet Take 25-50 mg by mouth       Allergies   Allergen Reactions     Penicillins Rash       Reviewed and updated as needed this visit by clinical staff  Tobacco  Allergies  Meds       Reviewed and updated as needed this visit by Provider         ROS:  Constitutional, HEENT, cardiovascular, pulmonary, gi and gu systems are negative, except as otherwise noted.    OBJECTIVE:     BP (!) 134/98   Pulse 69   Temp 97.6  F (36.4  C) (Oral)   Ht 1.803 m (5' 11\")   Wt 113.4 kg (250 lb)   SpO2 95%   BMI 34.87 kg/m    Body mass index is 34.87 kg/m .  GENERAL: healthy, alert and no distress  PSYCH: mentation appears normal, affect slightly flat, anxious, no si/hi    Diagnostic Test Results:  none     ASSESSMENT/PLAN:       ICD-10-CM    1. Anxiety F41.9 escitalopram (LEXAPRO) 10 MG " tablet   2. Mood disorder (H) F39    3. History of alcohol abuse Z87.898    4. Elevated blood pressure reading without diagnosis of hypertension R03.0      Longstanding anxiety. Exacerbated by recent changes at work (see SW note). He denies any suicidality and does not have a gun at home. I am not sure if his suicidal thoughts were from his substance abuse. He is very interested in getting help with his anxiety and has reconnected with AA. We talked about sleep, exercise, and healthy eating. I did recommend both medication and therapy. Will start lexapro as below and connect him with a therapist. SW assisting with therapy (ideally will have someone versed in MH and chem dep). Recheck in clinic in 4 weeks, SW to connect with patient prior. Crisis information given. See PI.    Recheck BP with next visit. Suspect today it was related more to anxiety.     Patient Instructions   Nice to meet you today! Thanks so being so open with me. Mental health is just as important as physical health.     Anxiety can cause lots of symptoms- trouble sleeping, decreased interest in sex, trouble concentrating, etc.   - we are going to treat you with both medication and therapy  - I think in the long run the therapy will be most helpful (Clarita will put in the referral and follow up with you to make sure we have everything set up)    Start the lexapro  -- Can have a little nausea in the first 2 weeks but usually goes away. Take with food.   -- this takes 4-6 weeks to have an effect    See Dr. Kat or myself back in 1 month - I don't expect things to be dramatically different but I want to make sure we have the pieces in play (therapy, medication, AA) to help. Call me sooner if things are not going well.     Things that can help with sleep   - keep up the exercise (beyond work)  - melatonin 3-5 mg (over the counter, natural hormone, not addictive, no hangover) is okay a few hours before bedtime.     The [facility] Crisis Response Unit  (CRU) provides 24-hour phone and face-to-face crisis intervention and consultation. Call [phone].   Other crisis resources:   Crisis Connection (24-hour hotline for mental health): 391.996.5355   [facility] Hotline for Youth Crisis and Suicide: 1-616-NXDIDWI (3-880-423-6797)        Delfino Salgado MD  [facility]

## 2019-02-14 NOTE — LETTER
[facility]  Complex Care Plan  About Me  Patient Name:  Roger Encinas    YOB: 1975  Age:     43 year old   [place] MRN:   [id number] Telephone Information:  Home Phone [phone]   Mobile [phone]       Address[address]    [address] [place] [address] Email address:  [email or web address]      Emergency Contact(s)  Name Relationship Lgl Grd Work Phone Home Phone Mobile Phone   1. TARAN ENCINAS* Spouse  none none [phone]          My Access Plan  Medical Emergency 911   Primary Clinic Line [facility] - [phone]   24 Hour Appointment Line [phone] or  [phone] ([phone]) (toll-free)   24 Hour Nurse Line [phone] (toll-free)   Preferred Urgent Care [facility] [place], [phone]   [facility]  [phone]   Preferred Pharmacy [facility] [place]  [place] - [address]      Behavioral Health Crisis Line The National Suicide Prevention Lifeline at 1-145.437.8571 or 911     My Care Team Members    Patient Care Team       Relationship Specialty Notifications Start End    Sebastián Kat MD PCP - General Internal Medicine  9/27/12     Phone: [phone] Fax: [phone]         [address] [place] [address] [address] [place] [address]    Jose Ortiz MD PCP - Assigned PCP   11/25/18     Phone: [phone] Fax: [phone]         [address] [place] [address] [address] [place] [address]    Clarita Arce LSW Lead Care Coordinator Primary Care - CC  2/14/19     Phone: [phone] Fax: [phone]                My Care Plans  Self Management and Treatment Plan  Goals  Goals        General    Mental Health Management (pt-stated)     Notes - Note created  2/14/2019 11:34 AM by Clarita Arce LSW    Goal Statement: I would like to find a counselor to help me manage my anxiety symptoms.  Measure of Success: Pt to meet with a counselor x2   Supportive Steps to Achieve:  CC will make a  referral to [facility] for counseling. Pt to schedule apt.   Barriers: time constraints due to work hours  Strengths: NA has openings until 9pm  Date to Achieve By: 4.2018  Patient expressed understanding of goal: Yes                      My Medical and Care Information  Problem List   Patient Active Problem List   Diagnosis     CARDIOVASCULAR SCREENING; LDL GOAL LESS THAN 160     UC (ulcerative colitis) (H)     Chronic knee pain     Tobacco abuse     HTN (hypertension)      Current Medications and Allergies:  See printed Medication Report.    Care Coordination Start Date: 2/14/2019   Frequency of Care Coordination: monthly   Form Last Updated: 02/14/2019

## 2019-02-14 NOTE — PATIENT INSTRUCTIONS
Nice to meet you today! Thanks so being so open with me. Mental health is just as important as physical health.     Anxiety can cause lots of symptoms- trouble sleeping, decreased interest in sex, trouble concentrating, etc.   - we are going to treat you with both medication and therapy  - I think in the long run the therapy will be most helpful (Clarita will put in the referral and follow up with you to make sure we have everything set up)    Start the lexapro  -- Can have a little nausea in the first 2 weeks but usually goes away. Take with food.   -- this takes 4-6 weeks to have an effect    See Dr. Kat or myself back in 1 month - I don't expect things to be dramatically different but I want to make sure we have the pieces in play (therapy, medication, AA) to help. Call me sooner if things are not going well.     Things that can help with sleep   - keep up the exercise (beyond work)  - melatonin 3-5 mg (over the counter, natural hormone, not addictive, no hangover) is okay a few hours before bedtime.     The [facility] Crisis Response Unit (CRU) provides 24-hour phone and face-to-face crisis intervention and consultation. Call [phone].   Other crisis resources:   Crisis Connection (24-hour hotline for mental health): 159.694.9097   [facility] Hotline for Youth Crisis and Suicide: 8-741-GLVGZCJ (1-979.405.9259)

## 2019-02-15 ASSESSMENT — ANXIETY QUESTIONNAIRES: GAD7 TOTAL SCORE: 13

## 2019-02-21 ENCOUNTER — ALLIED HEALTH/NURSE VISIT (OUTPATIENT)
Dept: PEDIATRICS | Facility: CLINIC | Age: 44
End: 2019-02-21
Payer: COMMERCIAL

## 2019-02-21 VITALS — DIASTOLIC BLOOD PRESSURE: 88 MMHG | SYSTOLIC BLOOD PRESSURE: 138 MMHG

## 2019-02-21 DIAGNOSIS — Z01.30 BP CHECK: Primary | ICD-10-CM

## 2019-02-21 PROCEDURE — 99207 ZZC NO CHARGE NURSE ONLY: CPT | Performed by: INTERNAL MEDICINE

## 2019-02-21 NOTE — PROGRESS NOTES
Roger Avelar is enrolled/participating in the retail pharmacy Blood Pressure Goals Achievement Program (BPGAP).  Roger Avelar was evaluated at [facility] on February 21, 2019 at which time his blood pressure was:    BP Readings from Last 3 Encounters:   02/21/19 138/88   02/14/19 (!) 134/98   11/15/18 114/74     Reviewed lifestyle modifications for blood pressure control and reduction: including making healthy food choices, managing weight, getting regular exercise, smoking cessation, reducing alcohol consumption, monitoring blood pressure regularly.     Roger Avelar is not experiencing symptoms.    Follow-Up: BP is at goal of < 140/90mmHg (patient 18+ years of age with or without diabetes).  Recommended follow-up at pharmacy in 6 months.     Recommendation to Provider: none    Roger Avelar was evaluated for enrollment into the PGEN study today.    PLEASE INITIATE ENROLLMENT DISCUSSION WITH HTN PTS  1) Between 30-80 years old                                                                                                               2) BMI between 19-50                                                                                                        3) BP ?140/90 AND ?170/110 patients aged 30-59         BP ?150/90 AND ?170/110 non-diabetic patients aged 60-80       BP ?140/90 AND ?170/110 diabetic patients aged 60-80  4) Additional requirements for uncontrolled HTN patients:        Pt on only 1 class of medication  5) EXCLUDE patient if confirmation of:                  ? Cardiac disease                  ? Chronic Kidney Disease                  ? Pregnancy/Breastfeeding                  ? Secondary Hypertension/Pre-eclampsia                                 ? Vascular disease    Patient eligible for enrollment:  No  Patient interested in enrollment:  Unknown    This note completed by: Thank You~  Kathy Anderson, PharmD,   [facility],  [place]  [phone]

## 2019-02-26 ENCOUNTER — OFFICE VISIT (OUTPATIENT)
Dept: DERMATOLOGY | Facility: CLINIC | Age: 44
End: 2019-02-26
Payer: COMMERCIAL

## 2019-02-26 DIAGNOSIS — L81.4 SOLAR LENTIGINOSIS: Primary | ICD-10-CM

## 2019-02-26 DIAGNOSIS — L82.1 SEBORRHEIC KERATOSIS: ICD-10-CM

## 2019-02-26 DIAGNOSIS — L72.0 EIC (EPIDERMAL INCLUSION CYST): ICD-10-CM

## 2019-02-26 PROCEDURE — 99203 OFFICE O/P NEW LOW 30 MIN: CPT | Performed by: DERMATOLOGY

## 2019-02-26 NOTE — PROGRESS NOTES
Service Date: 02/26/2019      CHIEF COMPLAINT:  Cyst and dark spots.      DERMATOLOGY PROBLEM LIST:     1.  Seborrheic keratoses.   2.  Epidermal inclusion cysts.      HISTORY OF PRESENT ILLNESS:  Mr. Avelar is a 43-year-old male presenting to Dermatology Clinic, seen at the request of Dr. Sebastián Kat, for initial evaluation of a cyst on the back that has been present for many years.  He had another similar cyst that became inflamed and was excised previously, also on his back.  Mr. Avelar states that the cyst is pruritic.  In addition, he has a dark spot on his right upper arm without history of trauma and 2 dark spots on his chest that he would like evaluated today.  He notes that he has worked outside and is not cautious with sun protection.      Patient Active Problem List   Diagnosis     CARDIOVASCULAR SCREENING; LDL GOAL LESS THAN 160     UC (ulcerative colitis) (H)     Chronic knee pain     Tobacco abuse     HTN (hypertension)     History of alcohol abuse     Anxiety     Mood disorder (H)       Allergies   Allergen Reactions     Penicillins Rash         Current Outpatient Medications   Medication     escitalopram (LEXAPRO) 10 MG tablet     hydrOXYzine (ATARAX) 25 MG tablet     No current facility-administered medications for this visit.           SOCIAL HISTORY:  The patient is  and lives in [place].  He is a  and works at [employer].      REVIEW OF SYSTEMS:  Feels well without other skin concerns.      FAMILY HISTORY:  No known family history of skin cancer.      PHYSICAL EXAMINATION:   GENERAL:  The patient is a healthy-appearing, 43-year-old male in no distress.   HEENT:  Conjunctivae are clear.   PULMONARY:  Breathing comfortably on room air.   SKIN:  Exam includes the scalp, face, back, chest, arms and hands.  Skin exam is normal except for as follows:     - Examination of the upper chest, superior shoulders, upper back with scattered, light-brown, evenly pigmented macules.   -  Linear abrasion on the right dorsal forearm.   - A 5 mm, scaly papule on the right upper arm.   - Waxy, tan-to-brown, 5 mm papules x2 on the right and left upper chest.   - An approximately 2 cm, firm, subcutaneous nodule on the upper back just left of midline with central punctum.   - Open comedo on the right mid back.   - Surgical scar on the left upper back.      ASSESSMENT AND PLAN:     1.  Epidermal inclusion cyst:  The patient is bothered by the lesion with associated pruritus.  Scheduled excision procedure slot.   2.  Solar lentigines:  Markers of past sun injury.  Sun protection advised.   3.  Seborrheic keratoses:  Benign hyperkeratotic papules.  No treatment advised.      The patient to return for cyst excision.      Thank you for this consultation.       Savi Reilly MD  Dermatology Staff             D: 2019   T: 2019   MT: binta      Name:     DEREK ENCINAS   MRN:      [id number]        Account:      [id number]   :      1975           Service Date: 2019      Document: [id number]

## 2019-02-26 NOTE — LETTER
2/26/2019      RE: Roger Avelar  [address]  [facility] [address]       Service Date: 02/26/2019      CHIEF COMPLAINT:  Cyst and dark spots.      DERMATOLOGY PROBLEM LIST:     1.  Seborrheic keratoses.   2.  Epidermal inclusion cysts.      HISTORY OF PRESENT ILLNESS:  Mr. Avelar is a 43-year-old male presenting to Dermatology Clinic, seen at the request of Dr. Sebastián Kat, for initial evaluation of a cyst on the back that has been present for many years.  He had another similar cyst that became inflamed and was excised previously, also on his back.  Mr. Avelar states that the cyst is pruritic.  In addition, he has a dark spot on his right upper arm without history of trauma and 2 dark spots on his chest that he would like evaluated today.  He notes that he has worked outside and is not cautious with sun protection.      Patient Active Problem List   Diagnosis     CARDIOVASCULAR SCREENING; LDL GOAL LESS THAN 160     UC (ulcerative colitis) (H)     Chronic knee pain     Tobacco abuse     HTN (hypertension)     History of alcohol abuse     Anxiety     Mood disorder (H)       Allergies   Allergen Reactions     Penicillins Rash         Current Outpatient Medications   Medication     escitalopram (LEXAPRO) 10 MG tablet     hydrOXYzine (ATARAX) 25 MG tablet     No current facility-administered medications for this visit.           SOCIAL HISTORY:  The patient is  and lives in [place].  He is a  and works at [employer].      REVIEW OF SYSTEMS:  Feels well without other skin concerns.      FAMILY HISTORY:  No known family history of skin cancer.      PHYSICAL EXAMINATION:   GENERAL:  The patient is a healthy-appearing, 43-year-old male in no distress.   HEENT:  Conjunctivae are clear.   PULMONARY:  Breathing comfortably on room air.   SKIN:  Exam includes the scalp, face, back, chest, arms and hands.  Skin exam is normal except for as follows:     - Examination of the upper chest,  superior shoulders, upper back with scattered, light-brown, evenly pigmented macules.   - Linear abrasion on the right dorsal forearm.   - A 5 mm, scaly papule on the right upper arm.   - Waxy, tan-to-brown, 5 mm papules x2 on the right and left upper chest.   - An approximately 2 cm, firm, subcutaneous nodule on the upper back just left of midline with central punctum.   - Open comedo on the right mid back.   - Surgical scar on the left upper back.      ASSESSMENT AND PLAN:     1.  Epidermal inclusion cyst:  The patient is bothered by the lesion with associated pruritus.  Scheduled excision procedure slot.   2.  Solar lentigines:  Markers of past sun injury.  Sun protection advised.   3.  Seborrheic keratoses:  Benign hyperkeratotic papules.  No treatment advised.      The patient to return for cyst excision.         Savi Reilly MD  Dermatology Staff             D: 2019   T: 2019   MT: binta      Name:     DEREK ENCINAS   MRN:      [id number]        Account:      [id number]   :      1975           Service Date: 2019      Document: [id number]

## 2019-02-28 NOTE — PROGRESS NOTES
Clinic Care Coordination Contact    Call to MH provider to check on appointment status.     Pt has the following appointments scheduled.     [facility]   [place] [facility]  [address], [place] [address]    Elizabeth Gibson  Counseling  2/28/19    Sherri Munroe  Medication Management  4/30/19 @ 8 am     CC will check in with pt at upcoming appointment to check in on progress.     ELSYIA Cardoza  Clinic Care Coordinator  [phone]  [email or web address]

## 2019-03-13 ENCOUNTER — PATIENT OUTREACH (OUTPATIENT)
Dept: CARE COORDINATION | Facility: CLINIC | Age: 44
End: 2019-03-13

## 2019-03-13 ENCOUNTER — OFFICE VISIT (OUTPATIENT)
Dept: PEDIATRICS | Facility: CLINIC | Age: 44
End: 2019-03-13
Payer: COMMERCIAL

## 2019-03-13 VITALS
SYSTOLIC BLOOD PRESSURE: 110 MMHG | HEART RATE: 75 BPM | OXYGEN SATURATION: 94 % | BODY MASS INDEX: 36.1 KG/M2 | DIASTOLIC BLOOD PRESSURE: 80 MMHG | HEIGHT: 71 IN | WEIGHT: 257.9 LBS | TEMPERATURE: 98.4 F

## 2019-03-13 DIAGNOSIS — F41.9 ANXIETY: Primary | ICD-10-CM

## 2019-03-13 DIAGNOSIS — F10.11 HISTORY OF ALCOHOL ABUSE: ICD-10-CM

## 2019-03-13 DIAGNOSIS — F39 MOOD DISORDER (H): ICD-10-CM

## 2019-03-13 PROBLEM — E66.01 MORBID OBESITY (H): Status: ACTIVE | Noted: 2019-03-13

## 2019-03-13 PROCEDURE — 99213 OFFICE O/P EST LOW 20 MIN: CPT | Performed by: INTERNAL MEDICINE

## 2019-03-13 ASSESSMENT — MIFFLIN-ST. JEOR: SCORE: 2086.96

## 2019-03-13 NOTE — PROGRESS NOTES
SUBJECTIVE:   Roger Avelar is a 43 year old male who presents to clinic today for the following health issues:    Depression and Anxiety Follow-Up    Status since last visit: Improved     Other associated symptoms:None    Complicating factors:     Significant life event: No     Current substance abuse: None    PHQ 2/10/2017 2/14/2019   PHQ-9 Total Score 0 7   Q9: Suicide Ideation Not at all Not at all     ALEJANDRO-7 SCORE 2/10/2017 2/14/2019   Total Score 2 13     PHQ-9  English  PHQ-9   Any Language  ALEJANDRO-7  Suicide Assessment Five-step Evaluation and Treatment (SAFE-T)    Amount of exercise or physical activity: 6-7 days/week     Problems taking medications regularly: No    Medication side effects: none    Diet: regular (no restrictions)    Overall he feels like things have improved.  He is tolerating the Lexapro well with no side effects.  He feels like this helps even him out a bit.    He is been going to AA 3-4 nights a week.  He has not had a drink.    He has been seeing his therapist.  He really likes her.  They have weekly appointment set up for the coming future.  They are still working through the intake process.    He also has a psychiatry appointment set up at the end of April.    He denies any SI or HI.    He does note that his wife does not quite understand mental health.  She wants to always have around he is discovered that he needs some space.  He has steps on his stopped drinking which is helpful but then he is also always around.  He is very excited because his wife is leaving town for 9 days in April.    Primarily he notices generalized anxiety.  He feels anxious all the time and small things can set him off.  He does note that switching activities and focusing on something else can be helpful.    Problem list and histories reviewed & adjusted, as indicated.  Additional history: as documented    Patient Active Problem List   Diagnosis     CARDIOVASCULAR SCREENING; LDL GOAL LESS THAN 160     UC  "(ulcerative colitis) (H)     Chronic knee pain     Tobacco abuse     HTN (hypertension)     History of alcohol abuse     Anxiety     Mood disorder (H)     Obesity (BMI 35.0-39.9) with comorbidity (H)     Past Surgical History:   Procedure Laterality Date     HERNIA REPAIR  1984       Social History     Tobacco Use     Smoking status: Light Tobacco Smoker     Packs/day: 0.50     Types: Cigarettes     Smokeless tobacco: Never Used     Tobacco comment: 4 cigs a day   Substance Use Topics     Alcohol use: Yes     Comment: 8 drinks daily     Family History   Problem Relation Age of Onset     Hypertension Father      Cancer Father      Circulatory Mother         varicose veins         Current Outpatient Medications   Medication Sig Dispense Refill     escitalopram (LEXAPRO) 10 MG tablet Take 1 tablet (10 mg) by mouth daily 30 tablet 3     Allergies   Allergen Reactions     Penicillins Rash       Reviewed and updated as needed this visit by clinical staff  Tobacco  Allergies  Meds  Med Hx  Surg Hx  Fam Hx  Soc Hx      Reviewed and updated as needed this visit by Provider         ROS:  Constitutional, HEENT, cardiovascular, pulmonary, gi and gu systems are negative, except as otherwise noted.    OBJECTIVE:     /80 (BP Location: Right arm, Patient Position: Sitting, Cuff Size: Adult Large)   Pulse 75   Temp 98.4  F (36.9  C) (Oral)   Ht 1.803 m (5' 11\")   Wt 117 kg (257 lb 14.4 oz)   SpO2 94%   BMI 35.97 kg/m    Body mass index is 35.97 kg/m .  GENERAL: healthy, alert and no distress  PSYCH: mentation appears normal, affect normal/bright    Diagnostic Test Results:  none     ASSESSMENT/PLAN:       ICD-10-CM    1. Anxiety F41.9    2. Mood disorder (H) F39    3. History of alcohol abuse Z87.898      Doing well with Lexapro.  No side effects.  Will continue at current dose for now.  He is seeing a psychiatrist at the end of April.  He will continue to work with his therapist weekly-we discussed this is likely " going to be the most helpful treatment long-term.  Congratulated him on his ongoing sobriety.    He will return in 2 months to see me for mental health follow-up and do a physical at this time.  He wishes to transfer primary care.    Patient Instructions   It was nice to see you in clinic.    Great job in putting the time to take care of yourself. I do think long-term the therapy and AA are going to be the most helpful.     Let's keep the lexapro the same until you see Psychiatry.     Keep up the good work with the exercise.     Clarita Arce () will reach out and see how things are going in a month.     See me in 2 months or so for a Wellness/Physical and Mental Health Follow Up. Come fasting and we'll check your cholesterol. Call me sooner if things are not going well.     The [facility] Crisis Response Unit (CRU) provides 24-hour phone and face-to-face crisis intervention and consultation. Call [phone].   Other crisis resources:   Crisis Connection (24-hour hotline for mental health): 135.239.3096   [facility] Hotline for Youth Crisis and Suicide: 9-392-KWXHGHV (1-419.190.7175)        Delfino Salgado MD  [facility]

## 2019-03-13 NOTE — PATIENT INSTRUCTIONS
It was nice to see you in clinic.    Great job in putting the time to take care of yourself. I do think long-term the therapy and AA are going to be the most helpful.     Let's keep the lexapro the same until you see Psychiatry.     Keep up the good work with the exercise.     Clarita Arce () will reach out and see how things are going in a month.     See me in 2 months or so for a Wellness/Physical and Mental Health Follow Up. Come fasting and we'll check your cholesterol. Call me sooner if things are not going well.     The [facility] Crisis Response Unit (CRU) provides 24-hour phone and face-to-face crisis intervention and consultation. Call [phone].   Other crisis resources:   Crisis Connection (24-hour hotline for mental health): 788.718.5597   [facility] Hotline for Youth Crisis and Suicide: 1-436-DPTDEYV (1-422.399.4323)

## 2019-03-15 NOTE — PROGRESS NOTES
Clinic Care Coordination Contact  OUTREACH    Chief Complaint   Patient presents with     Clinic Care Coordination - Follow-up     Mental Wellness      Clinical Concerns:  Current Medical Concerns:  Pt presenting to the clinic for a Mental Health follow-up. ARIELLA CC met with pt to review goals.     Financial/Insurance: Pt reports that he has some stress related to work environment. Pt's boss does have ongoing issues with an alcohol abuse disorder which is challenging for the pt to be around. Pt is currently looking into alternative employment opportunities.      Resources and Interventions:  Current Resources: Outpatient Mental Health Services- Pt met with a therapist at [facility] in [place] and felt that he connected with the provider. Pt will attend weekly meetings for the next several weeks prior to reassessing. Pt does have a psychiatry appointment scheduled in April.   Pt continues to participate in AA 3-4 times a week. Pt has been able to abstain from alcohol use.     Goals        General    Mental Health Management (pt-stated)     Notes - Note created  2/14/2019 11:34 AM by Clarita Arce LSW    Goal Statement: I would like to find a counselor to help me manage my anxiety symptoms.  Measure of Success: Pt to meet with a counselor x2   Supportive Steps to Achieve: ARIELLA CC will make a referral to [facility] for counseling. Pt to schedule apt.   Barriers: time constraints due to work hours  Strengths: NA has openings until 9pm  Date to Achieve By: 4.2018  Patient expressed understanding of goal: Yes              Patient/Caregiver understanding: Pt reports understanding and denies any additional questions or concerns at this times. ARIELLA CC engaged in AIDET communication during encounter.    Outreach Frequency: monthly  Future Appointments              In 4 days Savi Reilly MD [facility] JOHNATHAN Hinson    In 1 month Delfino Salgado MD [facility] JOHNATHAN Hinson         Plan: Pt will continue to meet with his mental health service providers. ARIELLA CC will follow-up with pt in 4 weeks to reassess.     ELYSIA Cardoza  Clinic Care Coordinator  [phone]  [email or web address]

## 2019-03-19 ENCOUNTER — OFFICE VISIT (OUTPATIENT)
Dept: DERMATOLOGY | Facility: CLINIC | Age: 44
End: 2019-03-19
Payer: COMMERCIAL

## 2019-03-19 VITALS
DIASTOLIC BLOOD PRESSURE: 80 MMHG | TEMPERATURE: 97.7 F | HEART RATE: 61 BPM | SYSTOLIC BLOOD PRESSURE: 134 MMHG | OXYGEN SATURATION: 95 %

## 2019-03-19 DIAGNOSIS — D48.5 NEOPLASM OF UNCERTAIN BEHAVIOR OF SKIN: Primary | ICD-10-CM

## 2019-03-19 PROCEDURE — 88304 TISSUE EXAM BY PATHOLOGIST: CPT | Mod: TC | Performed by: DERMATOLOGY

## 2019-03-19 PROCEDURE — 11403 EXC TR-EXT B9+MARG 2.1-3CM: CPT | Mod: 51 | Performed by: DERMATOLOGY

## 2019-03-19 PROCEDURE — 12031 INTMD RPR S/A/T/EXT 2.5 CM/<: CPT | Performed by: DERMATOLOGY

## 2019-03-19 NOTE — LETTER
3/19/2019      RE: Roger Avelar  [address]  [facility] [address]        Dermatologic Procedure Note        Patient Name:  Roger Avelar  Patient :  1975  Medical Record #: [id number]  Date of Operation: 2019        SURGEON:  Savi Reilly MD    ASSISTANT:  Kathy Ruffin    PREOPERATIVE DIAGNOSIS:  Presumed EIC    POSTOPERATIVE DIAGNOSIS:  Same    INDICATION: Excision of benign skin lesion    PROCEDURE PERFORMED:  1. Excise benign skin lesion  2. Intermediate closure on trunk    PROCEDURE:    PROCEDURE:  After discussion of risks and benefits of the procedure including the presence of a scar, bleeding and infection following the procedure, written consent was obtained from the patient.  The patient was placed in the prone position and the lesion on the L upper back was delineated by a marking pen. Local anesthesia included Xylocaine 1% with epinephrine 1:200,000 for a total of 6 ml. The area was cleansed with PCMX and draped in the usual sterile fashion.    Excision was performed using a 15 blade with 0 mm margins on all sides of the lesion. Excision was performed down to subcutaneous fat. Specimen was sent in formalin to pathology.    The wound was closed with 3 4.0  Vicryl deep buried sutures. Wound edges were approximated with running 4-0 prolene sutures.     Lesion size: 22 mm  Margins: 0 mm  Final wound length: 2.2 cm    The wound was dressed with Steristrips, Tegaderm, telfa  Patient was advised of wound care and healing and instructed to contact us with any concerns. Suture removal was planned in 14 days.  Limitation of activity was discussed in detail.      There were no complications to the procedure or abnormal findings.    Savi Reilly MD   of Dermatology

## 2019-03-19 NOTE — PROGRESS NOTES
Dermatologic Procedure Note        Patient Name:  Roger Avelar  Patient :  1975  Medical Record #: [id number]  Date of Operation: 2019        SURGEON:  Savi Reilly MD    ASSISTANT:  Kathy Ruffin    PREOPERATIVE DIAGNOSIS:  Presumed EIC    POSTOPERATIVE DIAGNOSIS:  Same    INDICATION: Excision of benign skin lesion    PROCEDURE PERFORMED:  1. Excise benign skin lesion  2. Intermediate closure on trunk    PROCEDURE:    PROCEDURE:  After discussion of risks and benefits of the procedure including the presence of a scar, bleeding and infection following the procedure, written consent was obtained from the patient.  The patient was placed in the prone position and the lesion on the L upper back was delineated by a marking pen. Local anesthesia included Xylocaine 1% with epinephrine 1:200,000 for a total of 6 ml. The area was cleansed with PCMX and draped in the usual sterile fashion.    Excision was performed using a 15 blade with 0 mm margins on all sides of the lesion. Excision was performed down to subcutaneous fat. Specimen was sent in formalin to pathology.    The wound was closed with 3 4.0  Vicryl deep buried sutures. Wound edges were approximated with running 4-0 prolene sutures.     Lesion size: 22 mm  Margins: 0 mm  Final wound length: 2.2 cm    The wound was dressed with Steristrips, Tegaderm, telfa  Patient was advised of wound care and healing and instructed to contact us with any concerns. Suture removal was planned in 14 days.  Limitation of activity was discussed in detail.      There were no complications to the procedure or abnormal findings.    Savi Reilly MD   of Dermatology

## 2019-03-22 LAB — COPATH REPORT: NORMAL

## 2019-04-03 ENCOUNTER — ALLIED HEALTH/NURSE VISIT (OUTPATIENT)
Dept: NURSING | Facility: CLINIC | Age: 44
End: 2019-04-03
Payer: COMMERCIAL

## 2019-04-03 DIAGNOSIS — Z48.02 VISIT FOR SUTURE REMOVAL: Primary | ICD-10-CM

## 2019-04-03 PROCEDURE — 99207 ZZC NO CHARGE NURSE ONLY: CPT

## 2019-04-03 NOTE — PROGRESS NOTES
Roger Avelar presents to the clinic for removal of sutures. The patient has had sutures in place for 15 days. There has been no patient reported signs or symptoms of infection or drainage. 1  suture are seen and located on the back. Tetanus status is up to date. All suture were easily removed today. Routine wound care discussed by the RN or provider. The patient will follow up as needed.    Mariajose Sotelo RN

## 2019-05-14 ENCOUNTER — PATIENT OUTREACH (OUTPATIENT)
Dept: CARE COORDINATION | Facility: CLINIC | Age: 44
End: 2019-05-14

## 2019-05-14 NOTE — LETTER
[facility]  [address] [place] [address]    May 20, 2019    Roger Avelar  [address] [facility] [facility] [address]      Dear Roger,    I am a clinic care coordinator who works with Delfino Salgado MD at [facility]. I recently tried to call and was unable to reach you. I wanted to follow-up with you and see how things are going lately and if there is anything that I can assist you with.     Please feel free to contact me at [phone], with any questions or concerns.     Sincerely,     Clarita Arce

## 2019-05-14 NOTE — PROGRESS NOTES
Clinic Care Coordination Contact  [facility]/Voicemail       Clinical Data: Care Coordinator Outreach  Outreach attempted x 2.  Left message on [employer] with call back information and requested return call. Please see contact log for detailed outreach attempt dates.  Plan: Care Coordinator will await return call and will mail out an unable to contact letter and will do no further outreaches at this time.    ELYSIA Cardoza  Clinic Care Coordinator  [phone]

## 2019-06-12 NOTE — PATIENT INSTRUCTIONS
It was nice to see you in clinic.    Labs today - I'll send you the result via M-DISC and fax Christian.     Today you are a little wheezy. This may be from a cold. Can try using the albuterol inhaler. If you're using it more than 2x a week when you're not sick then please see me in clinic because I'd want to do a breathing test and consider a daily inhaler.     Keep following with Christian and I really like that you're doing the extra class.   Tell them about the medication side effects - sometimes we change doses, medications, or add another medication.     I love the weight loss - keep this up! Focus on losing belt sizes rather than a number.     You will receive a call to schedule your Colonoscopy. If you do not hear from them in a week please call [phone] to schedule.    Let me know if you want to talk about quitting smoking.     Preventive Health Recommendations  Male Ages 40 to 49    Yearly exam:             See your health care provider every year in order to  o   Review health changes.   o   Discuss preventive care.    o   Review your medicines if your doctor has prescribed any.    You should be tested each year for STDs (sexually transmitted diseases) if you re at risk.     Have a cholesterol test every 5 years.     Have a colonoscopy (test for colon cancer) if someone in your family has had colon cancer or polyps before age 50.     After age 45, have a diabetes test (fasting glucose). If you are at risk for diabetes, you should have this test every 3 years.      Talk with your health care provider about whether or not a prostate cancer screening test (PSA) is right for you.    Shots: Get a flu shot each year. Get a tetanus shot every 10 years.     Nutrition:    Eat at least 5 servings of fruits and vegetables daily.     Eat whole-grain bread, whole-wheat pasta and brown rice instead of white grains and rice.     Get adequate Calcium and Vitamin D.     Lifestyle    Exercise for at least 150 minutes a  week (30 minutes a day, 5 days a week). This will help you control your weight and prevent disease.     Limit alcohol to one drink per day.     No smoking.     Wear sunscreen to prevent skin cancer.     See your dentist every six months for an exam and cleaning.      Preventive Health Recommendations  Male Ages 40 to 49    Yearly exam:             See your health care provider every year in order to  o   Review health changes.   o   Discuss preventive care.    o   Review your medicines if your doctor has prescribed any.    You should be tested each year for STDs (sexually transmitted diseases) if you re at risk.     Have a cholesterol test every 5 years.     Have a colonoscopy (test for colon cancer) if someone in your family has had colon cancer or polyps before age 50.     After age 45, have a diabetes test (fasting glucose). If you are at risk for diabetes, you should have this test every 3 years.      Talk with your health care provider about whether or not a prostate cancer screening test (PSA) is right for you.    Shots: Get a flu shot each year. Get a tetanus shot every 10 years.     Nutrition:    Eat at least 5 servings of fruits and vegetables daily.     Eat whole-grain bread, whole-wheat pasta and brown rice instead of white grains and rice.     Get adequate Calcium and Vitamin D.     Lifestyle    Exercise for at least 150 minutes a week (30 minutes a day, 5 days a week). This will help you control your weight and prevent disease.     Limit alcohol to one drink per day.     No smoking.     Wear sunscreen to prevent skin cancer.     See your dentist every six months for an exam and cleaning.

## 2019-06-12 NOTE — PROGRESS NOTES
SUBJECTIVE:   CC: Roger Avelar is an 44 year old male who presents for preventative health visit.     Healthy Habits:    Getting at least 3 servings of Calcium per day:  Yes    Bi-annual eye exam:  Yes    Dental care twice a year:  Yes    Sleep apnea or symptoms of sleep apnea:  None    Diet:  Regular (no restrictions)    Frequency of exercise:  6-7 days/week    Duration of exercise:  Greater than 60 minutes    Taking medications regularly:  Yes    Barriers to taking medications:  None    Medication side effects:  None    PHQ-2 Total Score:    Additional concerns today:  Yes (needs labs done )    Sees Psych at [facility]  Having sexual difficulties  Seeing them July 10th  Fax CMP to [phone]    Had a relapse but doing well for the last month  Going to a program at [facility] that will be one day a week - has orientation.    # UC  - Diagnosed when he was 14  - Sister diagnosed a few years ago and his niece  - Last colonoscopy was 2011 - told him he didn't have any indication that he has it  - Was azolfadine as a kid until he was 18 and then said he didn't need it anymore    Today's PHQ-2 Score:   PHQ-2 ( 1999 Pfizer) 7/9/2014   Q1: Little interest or pleasure in doing things 0   Q2: Feeling down, depressed or hopeless 0   PHQ-2 Score 0     PHQ-9 SCORE 2/10/2017 2/14/2019   PHQ-9 Total Score 0 7     ALEJANDRO-7 SCORE 2/10/2017 2/14/2019   Total Score 2 13     Abuse: Current or Past(Physical, Sexual or Emotional)- No  Do you feel safe in your environment? Yes    Social History     Tobacco Use     Smoking status: Light Tobacco Smoker     Packs/day: 0.50     Types: Cigarettes     Smokeless tobacco: Never Used     Tobacco comment: 4 cigs a day   Substance Use Topics     Alcohol use: Not Currently     Waiting on quitting smoking for now.     Alcohol Use 2/10/2017   Prescreen: >3 drinks/day or >7 drinks/week? The patient does not drink >3 drinks per day nor >7 drinks per week.     Last PSA: No results found for:  PSA    Reviewed orders with patient. Reviewed health maintenance and updated orders accordingly - Yes  Lab work is in process  Patient Active Problem List   Diagnosis     CARDIOVASCULAR SCREENING; LDL GOAL LESS THAN 160     UC (ulcerative colitis) (H)     Chronic knee pain     Tobacco abuse     History of hypertension     History of alcohol abuse     Anxiety     Mood disorder (H)     Obesity (BMI 30.0-34.9)     Past Surgical History:   Procedure Laterality Date     HERNIA REPAIR  1984       Social History     Tobacco Use     Smoking status: Light Tobacco Smoker     Packs/day: 0.50     Types: Cigarettes     Smokeless tobacco: Never Used     Tobacco comment: 4 cigs a day   Substance Use Topics     Alcohol use: Not Currently     Family History   Problem Relation Age of Onset     Hypertension Father      Cancer Father      Circulatory Mother         varicose veins     Ulcerative Colitis Sister          Current Outpatient Medications   Medication Sig Dispense Refill     albuterol (PROAIR HFA/PROVENTIL HFA/VENTOLIN HFA) 108 (90 Base) MCG/ACT inhaler Inhale 2 puffs into the lungs every 6 hours 2 Inhaler 1     escitalopram (LEXAPRO) 20 MG tablet Take 1 tablet (20 mg) by mouth daily       Allergies   Allergen Reactions     Penicillins Rash       Reviewed and updated as needed this visit by clinical staff  Tobacco  Allergies  Med Hx  Surg Hx  Fam Hx  Soc Hx        Reviewed and updated as needed this visit by Provider  Tobacco  Soc Hx       Past Medical History:   Diagnosis Date     Tobacco abuse 3/3/2014     UC (ulcerative colitis) (H) 9/9/2013      Past Surgical History:   Procedure Laterality Date     HERNIA REPAIR  1984       Review of Systems  CONSTITUTIONAL: NEGATIVE for fever, chills, change in weight  INTEGUMENTARY/SKIN: NEGATIVE for worrisome rashes, moles or lesions  EYES: NEGATIVE for vision changes or irritation  ENT: NEGATIVE for ear, mouth and throat problems  RESP: NEGATIVE for significant cough or  "SOB  CV: NEGATIVE for chest pain, palpitations or peripheral edema  GI: NEGATIVE for nausea, abdominal pain, heartburn, or change in bowel habits   male: negative for dysuria, hematuria, decreased urinary stream, erectile dysfunction, urethral discharge  MUSCULOSKELETAL: NEGATIVE for significant arthralgias or myalgia  NEURO: NEGATIVE for weakness, dizziness or paresthesias  PSYCHIATRIC: NEGATIVE for changes in mood or affect    OBJECTIVE:   /80 (BP Location: Right arm, Patient Position: Sitting, Cuff Size: Adult Large)   Pulse 55   Ht 1.803 m (5' 11\")   Wt 108 kg (238 lb)   SpO2 97%   BMI 33.19 kg/m       Wt Readings from Last 4 Encounters:   06/13/19 108 kg (238 lb)   03/13/19 117 kg (257 lb 14.4 oz)   02/14/19 113.4 kg (250 lb)   11/15/18 117 kg (258 lb)       Physical Exam  GENERAL: healthy, alert and no distress  EYES: Eyes grossly normal to inspection, PERRL and conjunctivae and sclerae normal  HENT: ear canals and TM's normal, nose and mouth without ulcers or lesions  NECK: no adenopathy, no asymmetry, masses, or scars and thyroid normal to palpation  RESP: end expiratory wheezes scattered throughout  CV: regular rate and rhythm, normal S1 S2, no S3 or S4, no murmur, click or rub, no peripheral edema and peripheral pulses strong  ABDOMEN: soft, nontender, no hepatosplenomegaly, no masses and bowel sounds normal  MS: no gross musculoskeletal defects noted, no edema  SKIN: no suspicious lesions or rashes  NEURO: Normal strength and tone, mentation intact and speech normal  PSYCH: mentation appears normal, affect normal/bright    Diagnostic Test Results:  See results note.    ASSESSMENT/PLAN:   1. Routine general medical examination at a health care facility    2. Obesity (BMI 30.0-34.9)  Losing weight! Discussed circumferential weight instead of a goal number.  Wt Readings from Last 4 Encounters:   06/13/19 108 kg (238 lb)   03/13/19 117 kg (257 lb 14.4 oz)   02/14/19 113.4 kg (250 lb)   11/15/18 " "117 kg (258 lb)   - Comprehensive metabolic panel    3. Mood disorder (H)  4. Anxiety  Much improved on lexapro.   Following with Christian.   Having sexual side effects - he will talk with them about this. Next OV in July.  - escitalopram (LEXAPRO) 20 MG tablet; Take 1 tablet (20 mg) by mouth daily    9. History of alcohol abuse  Did have a relapse but sober now for over a month. Going to AA. Also starting a \"relapse\" program at [facility].   Fax lab results to [facility].    5. Other ulcerative colitis with complication (H)  history of UC - no recent flairs. Last colon was > 5 years ago. Will refer to GI.  - GASTROENTEROLOGY ADULT REF PROCEDURE ONLY  - GASTROENTEROLOGY ADULT REF CONSULT ONLY    6. Tobacco abuse  7. Wheezing  Wheezing today but notes he has a cold. Will treat with albuterol. If using frequently he will rtc for Spirometry. ?copd with history of tobacco abuse.  - albuterol (PROAIR HFA/PROVENTIL HFA/VENTOLIN HFA) 108 (90 Base) MCG/ACT inhaler; Inhale 2 puffs into the lungs every 6 hours  Dispense: 2 Inhaler; Refill: 1  - Pneumococcal vaccine 23 valent PPSV23  (Pneumovax) [42356]    8. Screening cholesterol level  - Lipid panel reflex to direct LDL Fasting    10. Screening for human immunodeficiency virus without presence of risk factors  - HIV Screening    --------------  PATIENT INSTRUCTIONS  It was nice to see you in clinic.    Labs today - I'll send you the result via tenfarmshart and fax Christian.     Today you are a little wheezy. This may be from a cold. Can try using the albuterol inhaler. If you're using it more than 2x a week when you're not sick then please see me in clinic because I'd want to do a breathing test and consider a daily inhaler.     Keep following with Christian and I really like that you're doing the extra class.   Tell them about the medication side effects - sometimes we change doses, medications, or add another medication.     I love the weight loss - keep this up! Focus on losing belt " "sizes rather than a number.     You will receive a call to schedule your Colonoscopy. If you do not hear from them in a week please call [phone] to schedule.    Let me know if you want to talk about quitting smoking.   ---------------    COUNSELING:   Reviewed preventive health counseling, as reflected in patient instructions       Regular exercise       Healthy diet/nutrition       Immunizations    Vaccinated for: Pneumococcal         Safe sex practices/STD prevention       HIV screeninx in teen years, 1x in adult years, and at intervals if high risk       Colon cancer screening    Estimated body mass index is 33.19 kg/m  as calculated from the following:    Height as of this encounter: 1.803 m (5' 11\").    Weight as of this encounter: 108 kg (238 lb).     Weight management plan: Discussed healthy diet and exercise guidelines     reports that he has been smoking cigarettes.  He has been smoking about 0.50 packs per day. He has never used smokeless tobacco.  Tobacco Cessation Action Plan: Information offered: Patient not interested at this time    Counseling Resources:  ATP IV Guidelines  Pooled Cohorts Equation Calculator  FRAX Risk Assessment  ICSI Preventive Guidelines  Dietary Guidelines for Americans, 2010  USDA's MyPlate  ASA Prophylaxis  Lung CA Screening    Delfino Salgado MD  [facility]    "

## 2019-06-13 ENCOUNTER — OFFICE VISIT (OUTPATIENT)
Dept: PEDIATRICS | Facility: CLINIC | Age: 44
End: 2019-06-13
Payer: COMMERCIAL

## 2019-06-13 VITALS
SYSTOLIC BLOOD PRESSURE: 120 MMHG | OXYGEN SATURATION: 97 % | HEIGHT: 71 IN | BODY MASS INDEX: 33.32 KG/M2 | HEART RATE: 55 BPM | DIASTOLIC BLOOD PRESSURE: 80 MMHG | WEIGHT: 238 LBS

## 2019-06-13 DIAGNOSIS — F10.11 HISTORY OF ALCOHOL ABUSE: ICD-10-CM

## 2019-06-13 DIAGNOSIS — E66.811 OBESITY (BMI 30.0-34.9): ICD-10-CM

## 2019-06-13 DIAGNOSIS — F39 MOOD DISORDER (H): ICD-10-CM

## 2019-06-13 DIAGNOSIS — Z00.00 ROUTINE GENERAL MEDICAL EXAMINATION AT A HEALTH CARE FACILITY: Primary | ICD-10-CM

## 2019-06-13 DIAGNOSIS — K51.819 OTHER ULCERATIVE COLITIS WITH COMPLICATION (H): ICD-10-CM

## 2019-06-13 DIAGNOSIS — Z13.220 SCREENING CHOLESTEROL LEVEL: ICD-10-CM

## 2019-06-13 DIAGNOSIS — R06.2 WHEEZING: ICD-10-CM

## 2019-06-13 DIAGNOSIS — Z11.4 SCREENING FOR HUMAN IMMUNODEFICIENCY VIRUS WITHOUT PRESENCE OF RISK FACTORS: ICD-10-CM

## 2019-06-13 DIAGNOSIS — F41.9 ANXIETY: ICD-10-CM

## 2019-06-13 DIAGNOSIS — Z72.0 TOBACCO ABUSE: ICD-10-CM

## 2019-06-13 LAB
ALBUMIN SERPL-MCNC: 3.8 G/DL (ref 3.4–5)
ALP SERPL-CCNC: 60 U/L (ref 40–150)
ALT SERPL W P-5'-P-CCNC: 37 U/L (ref 0–70)
ANION GAP SERPL CALCULATED.3IONS-SCNC: 6 MMOL/L (ref 3–14)
AST SERPL W P-5'-P-CCNC: 23 U/L (ref 0–45)
BILIRUB SERPL-MCNC: 0.5 MG/DL (ref 0.2–1.3)
BUN SERPL-MCNC: 16 MG/DL (ref 7–30)
CALCIUM SERPL-MCNC: 8.4 MG/DL (ref 8.5–10.1)
CHLORIDE SERPL-SCNC: 106 MMOL/L (ref 94–109)
CHOLEST SERPL-MCNC: 153 MG/DL
CO2 SERPL-SCNC: 27 MMOL/L (ref 20–32)
CREAT SERPL-MCNC: 0.8 MG/DL (ref 0.66–1.25)
GFR SERPL CREATININE-BSD FRML MDRD: >90 ML/MIN/{1.73_M2}
GLUCOSE SERPL-MCNC: 95 MG/DL (ref 70–99)
HDLC SERPL-MCNC: 91 MG/DL
LDLC SERPL CALC-MCNC: 51 MG/DL
NONHDLC SERPL-MCNC: 62 MG/DL
POTASSIUM SERPL-SCNC: 4.2 MMOL/L (ref 3.4–5.3)
PROT SERPL-MCNC: 6.9 G/DL (ref 6.8–8.8)
SODIUM SERPL-SCNC: 139 MMOL/L (ref 133–144)
TRIGL SERPL-MCNC: 56 MG/DL

## 2019-06-13 PROCEDURE — 80061 LIPID PANEL: CPT | Performed by: INTERNAL MEDICINE

## 2019-06-13 PROCEDURE — 87389 HIV-1 AG W/HIV-1&-2 AB AG IA: CPT | Performed by: INTERNAL MEDICINE

## 2019-06-13 PROCEDURE — 80053 COMPREHEN METABOLIC PANEL: CPT | Performed by: INTERNAL MEDICINE

## 2019-06-13 PROCEDURE — 99396 PREV VISIT EST AGE 40-64: CPT | Mod: 25 | Performed by: INTERNAL MEDICINE

## 2019-06-13 PROCEDURE — 36415 COLL VENOUS BLD VENIPUNCTURE: CPT | Performed by: INTERNAL MEDICINE

## 2019-06-13 PROCEDURE — 90471 IMMUNIZATION ADMIN: CPT | Performed by: INTERNAL MEDICINE

## 2019-06-13 PROCEDURE — 90732 PPSV23 VACC 2 YRS+ SUBQ/IM: CPT | Performed by: INTERNAL MEDICINE

## 2019-06-13 RX ORDER — ALBUTEROL SULFATE 90 UG/1
2 AEROSOL, METERED RESPIRATORY (INHALATION) EVERY 6 HOURS
Qty: 2 INHALER | Refills: 1 | Status: SHIPPED | OUTPATIENT
Start: 2019-06-13 | End: 2019-08-29

## 2019-06-13 RX ORDER — ESCITALOPRAM OXALATE 20 MG/1
20 TABLET ORAL DAILY
Start: 2019-06-13

## 2019-06-13 ASSESSMENT — MIFFLIN-ST. JEOR: SCORE: 1991.69

## 2019-06-17 LAB — HIV 1+2 AB+HIV1 P24 AG SERPL QL IA: NONREACTIVE

## 2019-07-05 ENCOUNTER — MYC MEDICAL ADVICE (OUTPATIENT)
Dept: PEDIATRICS | Facility: CLINIC | Age: 44
End: 2019-07-05
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
## 2019-07-05 NOTE — TELEPHONE ENCOUNTER
Routing to EA Referrals Department: Patient is requesting a referral for PT/OT for Tawana Dukes in [place].     Is this covered by patients insurance?      Thank you!  Puja Dominique RN BSN   [facility]

## 2019-07-05 NOTE — TELEPHONE ENCOUNTER
This patient has open access insurance, no ins referrals needed, he can go there. Patient would have to check with his ins for coverage at [facility]. (Dayna in [place] referrals covering for Shelly)

## 2019-07-08 NOTE — TELEPHONE ENCOUNTER
[employer] message sent to patient, referrals faxed to krissy j[facility]on.    Puja Dominique RN BSN   [facility]
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
## 2019-07-10 ENCOUNTER — TELEPHONE (OUTPATIENT)
Dept: PEDIATRICS | Facility: CLINIC | Age: 44
End: 2019-07-10
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
## 2019-08-12 ENCOUNTER — TELEPHONE (OUTPATIENT)
Dept: PEDIATRICS | Facility: CLINIC | Age: 44
End: 2019-08-12
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
## 2019-08-12 DIAGNOSIS — F10.11 HISTORY OF ALCOHOL ABUSE: ICD-10-CM

## 2019-08-12 NOTE — TELEPHONE ENCOUNTER
Okay to send physical therapy referral to his location of choice.     LEEANN Salgado MD  Internal Medicine-Pediatrics'

## 2019-08-12 NOTE — TELEPHONE ENCOUNTER
Reason for Call: Request for an order or referral:    Order or referral being requested:     PT 2 x a week for 10 weeks to be sent to [facility].     Date needed: as soon as possible    Has the patient been seen by the PCP for this problem? YES    Additional comments: Pt stated that he is needing another order for PT because he will be switching locations    Phone number Patient can be reached at:  Home number on file [phone] (home)    Best Time:  any    Can we leave a detailed message on this number?  YES    Call taken on 8/12/2019 at 8:04 AM by Maddy Potts

## 2019-08-12 NOTE — TELEPHONE ENCOUNTER
Spoke with pt, he would like referral sent to physical therafy in [place] with Kayla Gabriel pt, no answer. Need fax number, ph is [phone].    Bing Jennings MA on 8/12/2019 at 4:55 PM

## 2019-08-14 ENCOUNTER — THERAPY VISIT (OUTPATIENT)
Dept: PHYSICAL THERAPY | Facility: CLINIC | Age: 44
End: 2019-08-14
Payer: COMMERCIAL

## 2019-08-14 DIAGNOSIS — M25.561 ACUTE PAIN OF RIGHT KNEE: ICD-10-CM

## 2019-08-14 DIAGNOSIS — Z47.89 AFTERCARE FOLLOWING SURGERY OF THE MUSCULOSKELETAL SYSTEM: ICD-10-CM

## 2019-08-14 DIAGNOSIS — R26.9 ABNORMAL GAIT: ICD-10-CM

## 2019-08-14 PROCEDURE — 97112 NEUROMUSCULAR REEDUCATION: CPT | Mod: GP | Performed by: PHYSICAL THERAPIST

## 2019-08-14 PROCEDURE — 97161 PT EVAL LOW COMPLEX 20 MIN: CPT | Mod: GP | Performed by: PHYSICAL THERAPIST

## 2019-08-14 PROCEDURE — 97116 GAIT TRAINING THERAPY: CPT | Mod: GP | Performed by: PHYSICAL THERAPIST
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
## 2019-08-16 NOTE — TELEPHONE ENCOUNTER
Pt has 2 appts scheduled at [facility] in [place].        Maddy Potts on 8/16/2019 at 9:42 AM
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
## 2019-08-20 ENCOUNTER — PATIENT OUTREACH (OUTPATIENT)
Dept: CARE COORDINATION | Facility: CLINIC | Age: 44
End: 2019-08-20

## 2019-08-20 ASSESSMENT — ACTIVITIES OF DAILY LIVING (ADL): DEPENDENT_IADLS:: INDEPENDENT
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
## 2019-08-20 NOTE — LETTER
[facility] CARE COORDINATION  [address] [place] [address]    August 20, 2019    Roger Avelar  [address] [facility] [id number]      Dear Roger,    I am a clinic care coordinator who works with Delfino Salgado MD. I wanted to thank you for spending the time to talk with me.  I wanted to provide you with my contact information so that you can call me with questions or concerns about your health care. Below is a description of clinic care coordination and how I can further assist you.     The clinic care coordinator is a registered nurse and/or  who understand the health care system. The goal of clinic care coordination is to help you manage your health and improve access to the [facility] system in the most efficient manner. The registered nurse can assist you in meeting your health care goals by providing education, coordinating services, and strengthening the communication among your providers. The  can assist you with financial, behavioral, psychosocial, chemical dependency, counseling, and/or psychiatric resources.    Please feel free to contact me with any questions or concerns. We at [facility] are focused on providing you with the highest-quality healthcare experience possible and that all starts with you.     Sincerely,     [facility]  Clinic Care Coordinator  [facility]  [facility]  [phone]  [email or web address]

## 2019-08-21 ENCOUNTER — THERAPY VISIT (OUTPATIENT)
Dept: PHYSICAL THERAPY | Facility: CLINIC | Age: 44
End: 2019-08-21
Payer: COMMERCIAL

## 2019-08-21 ENCOUNTER — TELEPHONE (OUTPATIENT)
Dept: PEDIATRICS | Facility: CLINIC | Age: 44
End: 2019-08-21

## 2019-08-21 DIAGNOSIS — M25.561 ACUTE PAIN OF RIGHT KNEE: ICD-10-CM

## 2019-08-21 DIAGNOSIS — R26.9 ABNORMAL GAIT: ICD-10-CM

## 2019-08-21 DIAGNOSIS — Z47.89 AFTERCARE FOLLOWING SURGERY OF THE MUSCULOSKELETAL SYSTEM: ICD-10-CM

## 2019-08-21 PROCEDURE — 97110 THERAPEUTIC EXERCISES: CPT | Mod: GP | Performed by: PHYSICAL THERAPIST

## 2019-08-21 PROCEDURE — 97530 THERAPEUTIC ACTIVITIES: CPT | Mod: GP | Performed by: PHYSICAL THERAPIST

## 2019-08-21 PROCEDURE — 97112 NEUROMUSCULAR REEDUCATION: CPT | Mod: GP | Performed by: PHYSICAL THERAPIST

## 2019-08-21 NOTE — TELEPHONE ENCOUNTER
1st attempt, LDVM for pt to call back to confirm appt scheduled w/ Dr Salgado on 8/29/19 in her DELMAR spot. I checked all other provider's DELMAR's as well to see if there was something sooner. Pt did want to see PCP only and I informed him to call back to verify this appt and if it didn't work we would possibly have to look at another clinic.    Maddy Potts on 8/21/2019 at 3:01 PM
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
## 2019-08-23 NOTE — TELEPHONE ENCOUNTER
I could try to do a phone visit with him today to talk about concussion symptoms and see if we need to get him into concussion clinic. Please keep in-person appointment as scheduled.     LEEANN Salgado MD  Internal Medicine-Pediatrics

## 2019-08-23 NOTE — TELEPHONE ENCOUNTER
Let's keep our appointment for Wednesday in clinic.     Hard time concentrating can be seen with concussion.     Will route to Triage to assess his vision symptoms - suspect these are also related to concussion.     LEEANN Salgado MD  Internal Medicine-Pediatrics

## 2019-08-23 NOTE — TELEPHONE ENCOUNTER
Spoke with pt, he isn't available today for ph visit. Could do morning 7-9am any day. Would you want to take huddle time on Monday morning 750 or so?

## 2019-08-26 NOTE — TELEPHONE ENCOUNTER
"Reached patient. Patient states he's continuing to feel very tired and headache as well as occasional blurry/\"fuzzy\" vision. Denies dizziness or losing consciousness. Patient also has recurrent headaches but also has broken leg so is taking ibuprofen and tylenol for pain relief.     Patient feels comfortable waiting until Thursday for appointment with Dr. Salgado.     Huddled with Dr. Salgado to review patient's report/update. Continue with follow up appointment and will discuss symptoms further and appropriate concussion follow up.   "

## 2019-08-28 ENCOUNTER — THERAPY VISIT (OUTPATIENT)
Dept: PHYSICAL THERAPY | Facility: CLINIC | Age: 44
End: 2019-08-28
Payer: COMMERCIAL

## 2019-08-28 DIAGNOSIS — Z47.89 AFTERCARE FOLLOWING SURGERY OF THE MUSCULOSKELETAL SYSTEM: ICD-10-CM

## 2019-08-28 DIAGNOSIS — R26.9 ABNORMAL GAIT: ICD-10-CM

## 2019-08-28 DIAGNOSIS — M25.561 ACUTE PAIN OF RIGHT KNEE: ICD-10-CM

## 2019-08-28 PROCEDURE — 97530 THERAPEUTIC ACTIVITIES: CPT | Mod: GP | Performed by: PHYSICAL THERAPIST

## 2019-08-28 PROCEDURE — 97110 THERAPEUTIC EXERCISES: CPT | Mod: GP | Performed by: PHYSICAL THERAPIST

## 2019-08-28 PROCEDURE — 97112 NEUROMUSCULAR REEDUCATION: CPT | Mod: GP | Performed by: PHYSICAL THERAPIST

## 2019-08-29 ENCOUNTER — OFFICE VISIT (OUTPATIENT)
Dept: PEDIATRICS | Facility: CLINIC | Age: 44
End: 2019-08-29
Payer: COMMERCIAL

## 2019-08-29 VITALS
WEIGHT: 236 LBS | BODY MASS INDEX: 33.04 KG/M2 | DIASTOLIC BLOOD PRESSURE: 80 MMHG | OXYGEN SATURATION: 98 % | SYSTOLIC BLOOD PRESSURE: 140 MMHG | HEIGHT: 71 IN | HEART RATE: 61 BPM

## 2019-08-29 DIAGNOSIS — F07.81 POST CONCUSSION SYNDROME: Primary | ICD-10-CM

## 2019-08-29 DIAGNOSIS — F39 MOOD DISORDER (H): ICD-10-CM

## 2019-08-29 DIAGNOSIS — Z86.79 HISTORY OF HYPERTENSION: ICD-10-CM

## 2019-08-29 DIAGNOSIS — F10.11 HISTORY OF ALCOHOL ABUSE: ICD-10-CM

## 2019-08-29 PROCEDURE — 99214 OFFICE O/P EST MOD 30 MIN: CPT | Performed by: INTERNAL MEDICINE

## 2019-08-29 RX ORDER — METHOCARBAMOL 500 MG/1
500 TABLET, FILM COATED ORAL 3 TIMES DAILY PRN
COMMUNITY
Start: 2019-07-18

## 2019-08-29 RX ORDER — HYDROXYZINE HYDROCHLORIDE 25 MG/1
25-50 TABLET, FILM COATED ORAL EVERY 6 HOURS PRN
COMMUNITY
Start: 2019-07-18

## 2019-08-29 RX ORDER — ACETAMINOPHEN 500 MG
500 TABLET ORAL
COMMUNITY
Start: 2019-07-03

## 2019-08-29 ASSESSMENT — MIFFLIN-ST. JEOR: SCORE: 1982.62
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
## 2019-08-29 NOTE — PATIENT INSTRUCTIONS
It was really good to see you today- I'm so glad you are okay.     A lot of the things you are telling me about sound like post-concussion syndrome (headaches, blurry eyes, light sensitivity, dizziness, fatigue). The good news is that the majority of people do get better but I can't give you a specific timeframe.     Let's start elavil at night for headaches. It does not help right away. Do not mix this with other medication. If it makes you too drowsy please let me know. This is not a long-term medication it's just for getting you over the hump of your concussion.     I do think a multidisciplinary concussion clinic is a good idea - I'll have my nurses look into who could get you in soonest ([facility], [place], [facility]). If it's not going to be for a while I can order individual therapies. Expect a call from them in the next week.    [facility] does offer free classes for Advanced Care Planning if you and your family are interested.       Healing After a Concussion     Rest  Rest is the best treatment for a concussion. You should avoid activities that cause your symptoms to get worse or make you feel tired. This would include physical activities as well as watching TV, texting or playing video games.    You may sleep or nap during the day as long as it does not prevent you from sleeping at night. If you find it is hard to fall asleep, talk to your doctor. You may need medicine to help you sleep.    If symptoms have not worsened, you do not need to be wakened and checked on during the night.      School  You can rest your brain by staying at home for a time. The amount of time away from school will depend on the injury and the symptoms.    At school, you may have trouble taking tests or working on a computer. Symptoms may get worse in band, choir, busy classes or a noisy lunchroom. A doctor can work with the school if you need a plan to help you succeed.    Work  You may need to change your work routine as you recover. A  "doctor can help you create a plan for the conditions at your job.      Treat pain    Take Tylenol (acetaminophen) for headaches and pain every 4 to 6 hours, as needed.    Do not take over-the-counter medicines such as ibuprofen, Advil, Motrin, Benadryl, Aleve, sleep aides or Tylenol PM. These drugs may cause new problems.    If you cannot manage your pain with Tylenol, call your doctor or go to the emergency department.      Watch symptoms closely  Each day keep track of your symptoms. This will help your doctor see how well you are healing. Write down the symptom, how often it occurs, how long it lasts, and what makes it better or worse.    Possible symptoms: headache, stomach upset, feeling confused or dizzy, motion sickness, and personality changes.      Returning to activity  Take your time returning to activity. A doctor can help determine what levels of activity are best for you. If you re returning to a sport, you should see a healthcare provider before doing so.      If you have questions, call  Concussion hotline: [phone] or Athletic medicine hotline: 971.673.4807.          For informational purposes only.  Not to replace the advice of your health care provider.  Copyright   2014 [facility].  All rights reserved.            Sleep Hygiene     What is it?    \"Sleep hygiene\" means having good sleep habits. Follow the tips below to sleep better at night.      Get on a schedule. Go to bed and get up at about the same time every day.    Listen to your body. Only try to sleep when you actually feel tired or sleepy.    Be patient. If you haven't been able to get to sleep after about 20 minutes or more, get up and do something calming or boring until you feel sleepy. Then, return to bed and try again.      Avoid caffeine (coffee, tea, cola drinks, chocolate and some medicines) for at least 4 to 6 hours before going to bed. We also suggest you don't use alcohol or nicotine (cigarettes) during this " "time. Both can make it harder for you to fall asleep and stay asleep.    Use your bed for sleeping only. That means no TV, computer or homework in bed!    Don't nap during the day. If you do nap, make sure it is for less than an hour and before 3 p.m.    Create sleep rituals that remind your body that it is time to sleep. Examples include breathing exercises, stretching, or reading a book.     Try a bath or shower before bed. Having a hot bath 1 to 2 hours before bedtime can help you feel sleepy.    Don't watch the clock. Checking the clock during the night can wake you up. It can also lead to negative thoughts such as \"I will never fall asleep.\"    Use a sleep diary. Track your sleep schedule to know your sleep patterns and to see where you can improve.    Get regular exercise. But try not to do heavy exercise in the 4 hours before bedtime.      Eat a healthy, balanced diet. Try eating a light, healthy snack before bed, but avoid eating a heavy meal.    Create the right sleeping area. A cool, dark, quiet room is best. If needed, try earplugs, fans and blackout curtains.      Keep your daytime routine the same even if you have a bad night sleep. Avoiding activities the next day can make it harder to sleep.          For informational purposes only. Not to replace the advice of your health care provider. Copyright   2013 [facility]. All rights reserved.    " given to family

## 2019-09-03 ENCOUNTER — HOSPITAL ENCOUNTER (EMERGENCY)
Facility: CLINIC | Age: 44
Discharge: HOME | End: 2019-09-04
Attending: EMERGENCY MEDICINE | Admitting: EMERGENCY MEDICINE
Payer: COMMERCIAL

## 2019-09-03 VITALS
HEIGHT: 71 IN | TEMPERATURE: 98 F | OXYGEN SATURATION: 92 % | HEART RATE: 72 BPM | RESPIRATION RATE: 16 BRPM | SYSTOLIC BLOOD PRESSURE: 123 MMHG | BODY MASS INDEX: 32.2 KG/M2 | DIASTOLIC BLOOD PRESSURE: 76 MMHG | WEIGHT: 230 LBS

## 2019-09-03 DIAGNOSIS — R07.9 ACUTE CHEST PAIN: ICD-10-CM

## 2019-09-03 DIAGNOSIS — F10.20 ALCOHOLISM (H): ICD-10-CM

## 2019-09-03 LAB — INTERPRETATION ECG - MUSE: NORMAL

## 2019-09-03 PROCEDURE — 93005 ELECTROCARDIOGRAM TRACING: CPT

## 2019-09-03 PROCEDURE — 99283 EMERGENCY DEPT VISIT LOW MDM: CPT

## 2019-09-03 PROCEDURE — 93005 ELECTROCARDIOGRAM TRACING: CPT | Mod: 76

## 2019-09-03 ASSESSMENT — MIFFLIN-ST. JEOR: SCORE: 1955.4

## 2019-09-04 ENCOUNTER — TELEPHONE (OUTPATIENT)
Dept: PEDIATRICS | Facility: CLINIC | Age: 44
End: 2019-09-04

## 2019-09-04 NOTE — ED NOTES
Room checked by myself, the ED physician and ERT on multiple occasions.  Heart monitor laying on bed with patches still intact and room empty.  Patient not in room, corona or bathroom.  ED provider aware that patient eloped prior to obtaining lab specimens.

## 2019-09-04 NOTE — ED PROVIDER NOTES
"History     Chief Complaint:  Chest Pain    History limited by: patient eloped.   History supplemented by EMS upon arrival, printed records sent from detox with the patient.    Roger Avelar is a 44 year old male who presents to the emergency department today by EMS morning for evaluation of chest pain.  Per EMS the patient was picked up from detox complaining of chest pain. He has a history of anxiety and is prescribed Lexapro but has not been taking this. EMS gave the patient Asprin, after he had been given gabapentin, hydroxyzine, and valium at detox just prior to EMS arrival by detox staff.        Allergies:  Penicillins    Medications:    Elavil  Lexapro  Atarax  Robaxin    Past Medical History:    ulcerative colitis  Chronic knee pain  Tobacco abuse  History of hypertension  History of alcohol abuse  Anxiety  Mood disorder  Obesity   Leg fracture    Past Surgical History:    hernia repair  Open reduction internal fixation tibia right    Family History:    The patient's family history includes Cancer in his father; Circulatory in his mother; Hypertension in his father; Ulcerative Colitis in his sister.    Social History:  The patient reports that he has been smoking cigarettes.  He has been smoking about 0.50 packs per day. He has never used smokeless tobacco. He reports that he drank alcohol. He reports that he does not use drugs.       Review of Systems   Reason unable to perform ROS: patient eloped.       Physical Exam     Patient Vitals for the past 24 hrs:   BP Temp Temp src Pulse Resp SpO2 Height Weight   09/03/19 2318 123/76 98  F (36.7  C) Oral 72 16 92 % 1.803 m (5' 11\") 104.3 kg (230 lb)     Physical Exam  General: Nontoxic-appearing male recumbent in room 21  HENT: MMM  CV: regular rate  Resp: Unlabored  MSK: no obvious major bony deformity  Skin: appropriately warm and dry  Neuro: awake, alert, ambulatory  Psych: Good eye contact, no evidence of agitation      Emergency Department Course "     ECG:  ECG taken at 2309, ECG read at 2328  Normal sinus rhythm  Minimal J-pt elevation V3  No reciprocal changes  Abnormal ECG  Rate 67 bpm. WA interval 168 ms. QRS duration 92 ms. QT/QTc 390/412 ms. P-R-T axes 69 65 58.    Emergency Department Course:  2055: 200 mg gabapentin, 50 mg hydroxyzine, and 5 mg Ativan given by detox staff prior to transport    Past medical records, nursing notes, and vitals reviewed.  2309: I performed an exam of the patient and obtained history, as documented above.   The patient was placed on continuous cardiac and pulse ox monitoring.    2356: I rechecked the patient. Went to room to recheck patient, no sign of patient    I spoke with ED staff.  Patient did not give notice of any intention to leave to any staff.  It is unknown where he went.    0010 rechecked room, patient has eloped.    Impression & Plan      Medical Decision Making:  This patient came by EMS with report of chest pain.  I spoke with him briefly as he was getting settled in upon EMS arrival, with the intention of going back to talk with him in more detail and examined him more comprehensively.  Unfortunately, he seems to have eloped.  The motive for doing so is unclear.  His premature departure from the emergency department leaves me unable to fully rule out a variety of potentially life-threatening causes of his presenting symptoms such as acute coronary syndrome, aortic dissection, pericarditis, pulmonary embolism, pneumothorax.  Because he left without signaling his intentions, I was unable to discuss with him the risks that this would involve.  However, based on my brief interaction with the patient as he arrived, he did demonstrate decision-making capacity.  He is welcome back for acute troubles or reassessment at any hour and would otherwise benefit from very close outpatient follow-up.  I was unable to give him any discharge instructions because of his premature departure.    Diagnosis:    ICD-10-CM   1.  Acute chest pain R07.9   2. Alcoholism (H) F10.20       Disposition:   Eloped      Scribe Disclosure:  I, Reena Castillo, am serving as a scribe at 11:09 PM on 9/3/2019 to document services personally performed by Sebastián Ndiaye MD based on my observations and the provider's statements to me.     EMERGENCY DEPARTMENT  This record was created at least in part using electronic voice recognition software, so please excuse any typographical errors.       Sebastián Ndiaye MD  09/04/19 0976

## 2019-09-04 NOTE — ED TRIAGE NOTES
M/S chest pain x2 hours; + nausea.  History of anxiety, Lexapro daily however did not take medication today.  Patient arrives from [facility] detox facility.

## 2019-09-04 NOTE — TELEPHONE ENCOUNTER
Received notification that he was in detox, got chest pain -> ED but left the ED.     Can we check in and make sure he has all the resources he needs for alcohol addiction?     How is he doing?     LEEANN Salgado MD  Internal Medicine-Pediatrics

## 2019-09-04 NOTE — ED NOTES
Bed: ED21  Expected date: 9/3/19  Expected time: 10:51 PM  Means of arrival: Ambulance  Comments:  HEMS 433 44M chest pain

## 2019-09-05 ENCOUNTER — PATIENT OUTREACH (OUTPATIENT)
Dept: CARE COORDINATION | Facility: CLINIC | Age: 44
End: 2019-09-05

## 2019-09-05 ENCOUNTER — THERAPY VISIT (OUTPATIENT)
Dept: PHYSICAL THERAPY | Facility: CLINIC | Age: 44
End: 2019-09-05
Payer: COMMERCIAL

## 2019-09-05 DIAGNOSIS — M25.561 ACUTE PAIN OF RIGHT KNEE: ICD-10-CM

## 2019-09-05 DIAGNOSIS — R26.9 ABNORMAL GAIT: ICD-10-CM

## 2019-09-05 DIAGNOSIS — Z47.89 AFTERCARE FOLLOWING SURGERY OF THE MUSCULOSKELETAL SYSTEM: ICD-10-CM

## 2019-09-05 PROCEDURE — 97110 THERAPEUTIC EXERCISES: CPT | Mod: GP | Performed by: PHYSICAL THERAPIST

## 2019-09-05 ASSESSMENT — ACTIVITIES OF DAILY LIVING (ADL): DEPENDENT_IADLS:: INDEPENDENT

## 2019-09-05 NOTE — PROGRESS NOTES
Clinic Care Coordination Contact  [facility]/Voicemail       Clinical Data: Care Coordinator Outreach  Outreach attempted x 1.  Left message on patient's voicemail with call back information and requested return call.  Plan: Care Coordinator will try to reach patient again in 1-2 business days.    Bill Brown [facility]  Clinic Care Coordinator  [facility]  [facility]  [phone]  [email or web address]

## 2019-09-11 ENCOUNTER — OFFICE VISIT (OUTPATIENT)
Dept: PHYSICAL MEDICINE AND REHAB | Facility: CLINIC | Age: 44
End: 2019-09-11
Payer: COMMERCIAL

## 2019-09-11 ENCOUNTER — THERAPY VISIT (OUTPATIENT)
Dept: PHYSICAL THERAPY | Facility: CLINIC | Age: 44
End: 2019-09-11
Payer: COMMERCIAL

## 2019-09-11 VITALS
HEART RATE: 55 BPM | WEIGHT: 235 LBS | SYSTOLIC BLOOD PRESSURE: 146 MMHG | BODY MASS INDEX: 32.9 KG/M2 | OXYGEN SATURATION: 97 % | HEIGHT: 71 IN | DIASTOLIC BLOOD PRESSURE: 93 MMHG

## 2019-09-11 DIAGNOSIS — S09.90XS FATIGUE DUE TO OLD HEAD INJURY: ICD-10-CM

## 2019-09-11 DIAGNOSIS — R53.83 FATIGUE DUE TO OLD HEAD INJURY: ICD-10-CM

## 2019-09-11 DIAGNOSIS — Z47.89 AFTERCARE FOLLOWING SURGERY OF THE MUSCULOSKELETAL SYSTEM: ICD-10-CM

## 2019-09-11 DIAGNOSIS — R26.81 GAIT INSTABILITY: Primary | ICD-10-CM

## 2019-09-11 DIAGNOSIS — G44.309 POST-CONCUSSION HEADACHE: ICD-10-CM

## 2019-09-11 DIAGNOSIS — M25.561 ACUTE PAIN OF RIGHT KNEE: ICD-10-CM

## 2019-09-11 DIAGNOSIS — R26.9 ABNORMAL GAIT: ICD-10-CM

## 2019-09-11 DIAGNOSIS — S06.0X9A CONCUSSION WITH LOSS OF CONSCIOUSNESS, INITIAL ENCOUNTER: ICD-10-CM

## 2019-09-11 DIAGNOSIS — G47.9 SLEEP DISTURBANCE: ICD-10-CM

## 2019-09-11 PROCEDURE — 97116 GAIT TRAINING THERAPY: CPT | Mod: GP | Performed by: PHYSICAL THERAPIST

## 2019-09-11 PROCEDURE — 97110 THERAPEUTIC EXERCISES: CPT | Mod: GP | Performed by: PHYSICAL THERAPIST

## 2019-09-11 PROCEDURE — 97530 THERAPEUTIC ACTIVITIES: CPT | Mod: GP | Performed by: PHYSICAL THERAPIST

## 2019-09-11 RX ORDER — LANOLIN ALCOHOL/MO/W.PET/CERES
3 CREAM (GRAM) TOPICAL
Qty: 30 TABLET | Refills: 0 | Status: SHIPPED | OUTPATIENT
Start: 2019-09-11

## 2019-09-11 RX ORDER — BUTALBITAL, ACETAMINOPHEN AND CAFFEINE 50; 325; 40 MG/1; MG/1; MG/1
1 TABLET ORAL DAILY PRN
Qty: 14 TABLET | Refills: 0 | Status: SHIPPED | OUTPATIENT
Start: 2019-09-11 | End: 2019-11-20

## 2019-09-11 RX ORDER — DEXTROAMPHETAMINE SACCHARATE, AMPHETAMINE ASPARTATE, DEXTROAMPHETAMINE SULFATE AND AMPHETAMINE SULFATE 1.25; 1.25; 1.25; 1.25 MG/1; MG/1; MG/1; MG/1
5 TABLET ORAL 2 TIMES DAILY
Qty: 60 TABLET | Refills: 0 | Status: SHIPPED | OUTPATIENT
Start: 2019-09-11 | End: 2019-11-20 | Stop reason: SINTOL

## 2019-09-11 SDOH — HEALTH STABILITY: MENTAL HEALTH: HOW OFTEN DO YOU HAVE A DRINK CONTAINING ALCOHOL?: NOT ASKED

## 2019-09-11 ASSESSMENT — PAIN SCALES - GENERAL: PAINLEVEL: MODERATE PAIN (5)

## 2019-09-11 ASSESSMENT — ANXIETY QUESTIONNAIRES
GAD7 TOTAL SCORE: 7
7. FEELING AFRAID AS IF SOMETHING AWFUL MIGHT HAPPEN: SEVERAL DAYS
7. FEELING AFRAID AS IF SOMETHING AWFUL MIGHT HAPPEN: SEVERAL DAYS
5. BEING SO RESTLESS THAT IT IS HARD TO SIT STILL: SEVERAL DAYS
2. NOT BEING ABLE TO STOP OR CONTROL WORRYING: SEVERAL DAYS
GAD7 TOTAL SCORE: 7
3. WORRYING TOO MUCH ABOUT DIFFERENT THINGS: SEVERAL DAYS
1. FEELING NERVOUS, ANXIOUS, OR ON EDGE: SEVERAL DAYS
6. BECOMING EASILY ANNOYED OR IRRITABLE: SEVERAL DAYS
GAD7 TOTAL SCORE: 7
4. TROUBLE RELAXING: SEVERAL DAYS

## 2019-09-11 ASSESSMENT — PATIENT HEALTH QUESTIONNAIRE - PHQ9
SUM OF ALL RESPONSES TO PHQ QUESTIONS 1-9: 11
10. IF YOU CHECKED OFF ANY PROBLEMS, HOW DIFFICULT HAVE THESE PROBLEMS MADE IT FOR YOU TO DO YOUR WORK, TAKE CARE OF THINGS AT HOME, OR GET ALONG WITH OTHER PEOPLE: SOMEWHAT DIFFICULT
SUM OF ALL RESPONSES TO PHQ QUESTIONS 1-9: 11

## 2019-09-11 ASSESSMENT — MIFFLIN-ST. JEOR: SCORE: 1978.08
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
## 2019-09-12 ASSESSMENT — ANXIETY QUESTIONNAIRES: GAD7 TOTAL SCORE: 7

## 2019-09-12 ASSESSMENT — PATIENT HEALTH QUESTIONNAIRE - PHQ9: SUM OF ALL RESPONSES TO PHQ QUESTIONS 1-9: 11

## 2019-09-12 NOTE — PATIENT INSTRUCTIONS
"    GENERAL ADVICE:  ~ Gradually ease back into your usual activities.   ~ Rest as needed to help your symptoms go away.  - Consider pairing 50 minutes of activity with 10 minutes of rest.  ~ Allow yourself more time for activities.  ~ Write things down.  At home, at work, whenever there is something that you should remember, even it is simple.  SCREENS:  ~ Change settings on your phone and computer using the \"Blue Light Filter\" (Night Shift on all  Apple products)  ~ The goal is making screens more yellow and less blue.    ~ If this is not an option you can download this program, Kohort, to adjust your screen resolution.  ~ [employer]uka for various filter and font apps  ~ Turn screen brightness down  ~ Increase font size  ~ Limit screen activities including computer, TV and phones.  NECK PAIN:  ~ Ice or Heat are good~  ~ Massage is ok if it doesn't trigger more symptoms~  ~ Gentle stretches and range-of-motion are helpful.  DIZZINESS:  ~ No driving when dizzy.  ~ No biking, climbing heights or ladders if dizzy.  FATIGUE:  ~ Daily exercise is strongly encouraged.  Start with a 10 min walk and increase the time as tolerated until you are walking 30 minutes per day.    ~ Focus on Good sleep hygiene instead of napping . Your goal should be 8 hours of sleep every night.  ~ Try Melatonin 1 hour before bed  ANXIETY OR MOOD SWINGS:  ~ If you are irritable or anxious, take a break in a quiet room.  ~ Try using the free Calm evon for guided breathing and mindfulness/meditation.  ~ Explore [employer] (https://www.headspace.com) for free and easy-to-use meditation guidance.  LIGHT SENSITIVITIES:  ~ Avoid florescent lighting when possible.  ~Yellow or joe tinted lenses may help reduce computer or night-time road glare.             ~ Amazon has a $10.00 option: [employer] yellow Night Vision.  NOISE SENSITIVITIES:  ~ Try listening to calming sounds such as the \"Calm Evon\" to help shift your focus off of more irritating " sounds.  ~ Avoid crowded areas at first then slowly introduce yourself to small increments of crowded, noisy areas.  ~ Try High fidelity earplugs used by Musicians. Etymotic ETY-Plugs, can be found on Amazon for $13.00.  DIET:  - In principle incorporate more protein, lots of veggies, some fruit, whole grains.    - Less sweets and saturated fat.   - Mediterranean Diet is an easy-to-follow example.  ~ Drink plenty of water throughout the day (8-10 glasses per day)    [facility]   Nurse Line # [phone]   Fax # [phone]  Scheduling; # [phone]  E-MAIL - [email or web address]    Thank you for allowing us to be a part of your care.

## 2019-09-16 DIAGNOSIS — R53.83 FATIGUE DUE TO OLD HEAD INJURY: Primary | ICD-10-CM

## 2019-09-16 DIAGNOSIS — S09.90XS FATIGUE DUE TO OLD HEAD INJURY: Primary | ICD-10-CM

## 2019-09-18 DIAGNOSIS — S06.0X9A CONCUSSION WITH LOSS OF CONSCIOUSNESS, INITIAL ENCOUNTER: ICD-10-CM

## 2019-09-18 DIAGNOSIS — G47.9 SLEEP DISTURBANCE: ICD-10-CM

## 2019-09-18 DIAGNOSIS — S09.90XS FATIGUE DUE TO OLD HEAD INJURY: Primary | ICD-10-CM

## 2019-09-18 DIAGNOSIS — R53.83 FATIGUE DUE TO OLD HEAD INJURY: Primary | ICD-10-CM

## 2019-09-21 ENCOUNTER — HOSPITAL ENCOUNTER (EMERGENCY)
Facility: CLINIC | Age: 44
Discharge: HOME | End: 2019-09-22
Attending: EMERGENCY MEDICINE | Admitting: EMERGENCY MEDICINE
Payer: COMMERCIAL

## 2019-09-21 VITALS
BODY MASS INDEX: 32.2 KG/M2 | HEART RATE: 74 BPM | OXYGEN SATURATION: 94 % | WEIGHT: 230 LBS | TEMPERATURE: 98.4 F | RESPIRATION RATE: 18 BRPM | HEIGHT: 71 IN | DIASTOLIC BLOOD PRESSURE: 112 MMHG | SYSTOLIC BLOOD PRESSURE: 162 MMHG

## 2019-09-21 DIAGNOSIS — F10.220 ALCOHOL DEPENDENCE WITH UNCOMPLICATED INTOXICATION (H): ICD-10-CM

## 2019-09-21 DIAGNOSIS — F10.929 ALCOHOLIC INTOXICATION WITH COMPLICATION (H): ICD-10-CM

## 2019-09-21 PROCEDURE — 25000132 ZZH RX MED GY IP 250 OP 250 PS 637: Performed by: EMERGENCY MEDICINE

## 2019-09-21 PROCEDURE — 25000125 ZZHC RX 250: Performed by: EMERGENCY MEDICINE

## 2019-09-21 PROCEDURE — 99283 EMERGENCY DEPT VISIT LOW MDM: CPT

## 2019-09-21 RX ADMIN — LIDOCAINE HYDROCHLORIDE 30 ML: 20 SOLUTION ORAL; TOPICAL at 21:01

## 2019-09-21 ASSESSMENT — MIFFLIN-ST. JEOR: SCORE: 1955.4

## 2019-09-21 NOTE — ED AVS SNAPSHOT
[facility] Emergency Department  [address]  [facility] [address]  Phone:  [phone]  Fax:  [phone]                                    Roger Avelar   MRN: [id number]    Department:  [facility] Emergency Department   Date of Visit:  9/21/2019           After Visit Summary Signature Page    I have received my discharge instructions, and my questions have been answered. I have discussed any challenges I see with this plan with the nurse or doctor.    ..........................................................................................................................................  Patient/Patient Representative Signature      ..........................................................................................................................................  Patient Representative Print Name and Relationship to Patient    ..................................................               ................................................  Date                                   Time    ..........................................................................................................................................  Reviewed by Signature/Title    ...................................................              ..............................................  Date                                               Time          22EPIC Rev 08/18
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
## 2019-09-22 NOTE — ED NOTES
Pt reports epigastric pain following eating sandwich, states sx have subsided at this time. MD made aware

## 2019-09-22 NOTE — ED PROVIDER NOTES
Received sign out from Dr. Covarrubias. Unfortunately there are no detox beds available in the [facility].    Wife is here to take hime home.    Disposition: home    (F10.220) Alcohol dependence with uncomplicated intoxication (H)              Giovanni Ramírez MD  09/22/19 0010

## 2019-09-22 NOTE — DISCHARGE INSTRUCTIONS
Discharge Instructions  Alcohol Intoxication    You have been seen today with alcohol intoxication. This means that you have enough alcohol in your system to impair your ability to mentally and physically function. When you are intoxicated, we are not allowed to release you without a sober adult to be with you. You may not drive, operate dangerous equipment, or do anything else dangerous until you are sober.    You may have come to the Emergency Department because of your intoxication, or for another reason, such as because of an injury. No matter what the case is, this visit is a  red flag  regarding alcohol use, and you should consider whether your drinking pattern is a problem for you.     You may be at risk for alcohol-related problems if:    Men: you drink more than 14 drinks per week, or more than 4 drinks per occasion.    Women: you drink more than 7 drinks per week or more than 3 drinks per occasion.    You have black-outs.  You do things you regret while drinking.  You have legal problems because of drinking (DUI).  You have job problems because of drinking (you call in sick to work because of drinking).    CAGE Questions  Have you ever felt you should cut down on your drinking?  Have people annoyed you by criticizing your drinking?  Have you ever felt bad or guilty about your drinking?  Have you ever had a drink first thing in the morning to steady your nerves or get rid of a hangover (eye opener)?    If you answer yes to any of the CAGE questions, you may have a problem with alcohol.      Return to the Emergency Department if:  You become shaky or tremble when you try to stop drinking.    You have a seizure or pass out.    You throw up (vomit) blood. This may be bright red or it may look like black coffee grounds.    You have blood in the stool. This may be bright red or appear as a black, tarry, bad smelling stool.    You become lightheaded or faint.      For further help, contact:   Your caregiver.     Alcoholics Anonymous (AA).    A drug or alcohol rehabilitation program.    You can get information on alcohol resources and groups by calling the number 211 or 1-163.256.9824 on any phone.       Seek medical care if:  You have persistent vomiting.    You have persistent pain in any part of your body.    You do not feel better after a few days.    If you were given a prescription for medicine here today, be sure to read all of the information (including the package insert) that comes with your prescription.  This will include important information about the medicine, its side effects, and any warnings that you need to know about.  The pharmacist who fills the prescription can provide more information and answer questions you may have about the medicine.  If you have questions or concerns that the pharmacist cannot address, please call or return to the Emergency Department.   Remember that you can always come back to the Emergency Department if you are not able to see your regular doctor in the amount of time listed above, if you get any new symptoms, or if there is anything that worries you.

## 2019-09-22 NOTE — ED NOTES
Pt provided with discharge paperwork and educated on recommended follow-up with PCP and recommended detox if he starts to drink again. Pt educated on how to manage symptoms at home and when to seek medical attention. Pt voiced understanding and denied any questions at discharge.

## 2019-09-22 NOTE — ED TRIAGE NOTES
Pt arrives to the ED via EMS due to alcohol intoxication. Per EMS report, wife states that pt has been drinking for the past 5 days. Pt states past 2 days. EMS states pt has been not eating and has been unable to care for himself appropriately at home. Pt unable to tell nurse how much he has been drinking. Denies other drug use. BG 91 for EMS.

## 2019-09-25 ENCOUNTER — PATIENT OUTREACH (OUTPATIENT)
Dept: CARE COORDINATION | Facility: CLINIC | Age: 44
End: 2019-09-25

## 2019-09-25 ENCOUNTER — TELEPHONE (OUTPATIENT)
Dept: BEHAVIORAL HEALTH | Facility: CLINIC | Age: 44
End: 2019-09-25

## 2019-09-25 ENCOUNTER — THERAPY VISIT (OUTPATIENT)
Dept: PHYSICAL THERAPY | Facility: CLINIC | Age: 44
End: 2019-09-25
Payer: COMMERCIAL

## 2019-09-25 DIAGNOSIS — R26.9 ABNORMAL GAIT: ICD-10-CM

## 2019-09-25 DIAGNOSIS — Z47.89 AFTERCARE FOLLOWING SURGERY OF THE MUSCULOSKELETAL SYSTEM: ICD-10-CM

## 2019-09-25 DIAGNOSIS — M25.561 ACUTE PAIN OF RIGHT KNEE: ICD-10-CM

## 2019-09-25 PROCEDURE — 97110 THERAPEUTIC EXERCISES: CPT | Mod: GP | Performed by: PHYSICAL THERAPIST

## 2019-09-25 PROCEDURE — 97116 GAIT TRAINING THERAPY: CPT | Mod: GP | Performed by: PHYSICAL THERAPIST

## 2019-09-25 ASSESSMENT — ACTIVITIES OF DAILY LIVING (ADL): DEPENDENT_IADLS:: INDEPENDENT

## 2019-09-25 NOTE — PROGRESS NOTES
Clinic Care Coordination Contact  [facility]/Voicemail    Referral Source: PCP  Clinical Data: Care Coordinator Outreach  Outreach attempted x 2.  Left message on patient's voicemail with call back information and requested return call.  Plan: Care Coordinator will try to reach patient again in 3-5 business days.    Bill Brown [facility]  Clinic Care Coordinator  [facility]  [facility]  [phone]  [email or web address]

## 2019-09-25 NOTE — TELEPHONE ENCOUNTER
Writer spoke with client about Dr. Tucker's referral to see Igor Bhatt to evaluate and treat for possible PTSD. Client states this referral is not needed as he already has a therapist.

## 2019-09-27 ENCOUNTER — HEALTH MAINTENANCE LETTER (OUTPATIENT)
Age: 44
End: 2019-09-27

## 2019-09-30 ENCOUNTER — PATIENT OUTREACH (OUTPATIENT)
Dept: CARE COORDINATION | Facility: CLINIC | Age: 44
End: 2019-09-30

## 2019-09-30 ASSESSMENT — ACTIVITIES OF DAILY LIVING (ADL): DEPENDENT_IADLS:: INDEPENDENT

## 2019-09-30 NOTE — PROGRESS NOTES
Clinic Care Coordination Contact  [facility]/Voicemail    Referral Source: PCP  Clinical Data: Care Coordinator Outreach  Outreach attempted x 3.  Left message on patient's voicemail with call back information and requested return call.  Plan: Care Coordinator sent care coordination introduction letter on 8/20/2019 via mail. Care Coordinator will do no further outreaches at this time.    Bill Brown [facility]  Clinic Care Coordinator  [facility]  [facility]  [phone]  [email or web address]

## 2019-10-05 ENCOUNTER — TRANSFERRED RECORDS (OUTPATIENT)
Dept: HEALTH INFORMATION MANAGEMENT | Facility: CLINIC | Age: 44
End: 2019-10-05

## 2019-10-05 ENCOUNTER — HOSPITAL ENCOUNTER (EMERGENCY)
Facility: CLINIC | Age: 44
Discharge: HOME | End: 2019-10-05
Attending: EMERGENCY MEDICINE | Admitting: EMERGENCY MEDICINE
Payer: COMMERCIAL

## 2019-10-05 ENCOUNTER — APPOINTMENT (OUTPATIENT)
Dept: GENERAL RADIOLOGY | Facility: CLINIC | Age: 44
End: 2019-10-05
Attending: EMERGENCY MEDICINE
Payer: COMMERCIAL

## 2019-10-05 VITALS
WEIGHT: 230 LBS | SYSTOLIC BLOOD PRESSURE: 143 MMHG | OXYGEN SATURATION: 98 % | HEART RATE: 69 BPM | BODY MASS INDEX: 32.2 KG/M2 | DIASTOLIC BLOOD PRESSURE: 97 MMHG | RESPIRATION RATE: 22 BRPM | TEMPERATURE: 97.3 F | HEIGHT: 71 IN

## 2019-10-05 DIAGNOSIS — R79.89 ELEVATED LFTS: ICD-10-CM

## 2019-10-05 DIAGNOSIS — R05.9 COUGH: ICD-10-CM

## 2019-10-05 DIAGNOSIS — J40 BRONCHITIS: ICD-10-CM

## 2019-10-05 DIAGNOSIS — R10.13 ABDOMINAL PAIN, EPIGASTRIC: ICD-10-CM

## 2019-10-05 LAB
ALBUMIN SERPL-MCNC: 3.6 G/DL (ref 3.4–5)
ALBUMIN UR-MCNC: 70 MG/DL
ALP SERPL-CCNC: 130 U/L (ref 40–150)
ALT SERPL W P-5'-P-CCNC: 91 U/L (ref 0–70)
ANION GAP SERPL CALCULATED.3IONS-SCNC: 9 MMOL/L (ref 3–14)
APPEARANCE UR: CLEAR
AST SERPL W P-5'-P-CCNC: 83 U/L (ref 0–45)
BASOPHILS # BLD AUTO: 0 10E9/L (ref 0–0.2)
BASOPHILS NFR BLD AUTO: 0.3 %
BILIRUB SERPL-MCNC: 0.9 MG/DL (ref 0.2–1.3)
BILIRUB UR QL STRIP: NEGATIVE
BUN SERPL-MCNC: 9 MG/DL (ref 7–30)
CALCIUM SERPL-MCNC: 9.1 MG/DL (ref 8.5–10.1)
CHLORIDE SERPL-SCNC: 99 MMOL/L (ref 94–109)
CO2 SERPL-SCNC: 26 MMOL/L (ref 20–32)
COLOR UR AUTO: YELLOW
CREAT SERPL-MCNC: 0.8 MG/DL (ref 0.66–1.25)
DIFFERENTIAL METHOD BLD: NORMAL
EOSINOPHIL # BLD AUTO: 0 10E9/L (ref 0–0.7)
EOSINOPHIL NFR BLD AUTO: 0.5 %
ERYTHROCYTE [DISTWIDTH] IN BLOOD BY AUTOMATED COUNT: 13.9 % (ref 10–15)
ETHANOL SERPL-MCNC: <0.01 G/DL
GFR SERPL CREATININE-BSD FRML MDRD: >90 ML/MIN/{1.73_M2}
GLUCOSE SERPL-MCNC: 92 MG/DL (ref 70–99)
GLUCOSE UR STRIP-MCNC: NEGATIVE MG/DL
HCT VFR BLD AUTO: 45.1 % (ref 40–53)
HGB BLD-MCNC: 15.8 G/DL (ref 13.3–17.7)
HGB UR QL STRIP: NEGATIVE
IMM GRANULOCYTES # BLD: 0 10E9/L (ref 0–0.4)
IMM GRANULOCYTES NFR BLD: 0.2 %
KETONES UR STRIP-MCNC: 80 MG/DL
LEUKOCYTE ESTERASE UR QL STRIP: NEGATIVE
LIPASE SERPL-CCNC: 134 U/L (ref 73–393)
LYMPHOCYTES # BLD AUTO: 1.2 10E9/L (ref 0.8–5.3)
LYMPHOCYTES NFR BLD AUTO: 18.4 %
MCH RBC QN AUTO: 30.5 PG (ref 26.5–33)
MCHC RBC AUTO-ENTMCNC: 35 G/DL (ref 31.5–36.5)
MCV RBC AUTO: 87 FL (ref 78–100)
MONOCYTES # BLD AUTO: 0.4 10E9/L (ref 0–1.3)
MONOCYTES NFR BLD AUTO: 6.8 %
MUCOUS THREADS #/AREA URNS LPF: PRESENT /LPF
NEUTROPHILS # BLD AUTO: 4.7 10E9/L (ref 1.6–8.3)
NEUTROPHILS NFR BLD AUTO: 73.8 %
NITRATE UR QL: NEGATIVE
NRBC # BLD AUTO: 0 10*3/UL
NRBC BLD AUTO-RTO: 0 /100
PH UR STRIP: 7.5 PH (ref 5–7)
PLATELET # BLD AUTO: 152 10E9/L (ref 150–450)
POTASSIUM SERPL-SCNC: 3.3 MMOL/L (ref 3.4–5.3)
PROT SERPL-MCNC: 7.2 G/DL (ref 6.8–8.8)
RBC # BLD AUTO: 5.18 10E12/L (ref 4.4–5.9)
RBC #/AREA URNS AUTO: <1 /HPF (ref 0–2)
SODIUM SERPL-SCNC: 134 MMOL/L (ref 133–144)
SOURCE: ABNORMAL
SP GR UR STRIP: 1.02 (ref 1–1.03)
UROBILINOGEN UR STRIP-MCNC: 3 MG/DL (ref 0–2)
WBC # BLD AUTO: 6.3 10E9/L (ref 4–11)
WBC #/AREA URNS AUTO: 1 /HPF (ref 0–5)

## 2019-10-05 PROCEDURE — 80053 COMPREHEN METABOLIC PANEL: CPT | Performed by: EMERGENCY MEDICINE

## 2019-10-05 PROCEDURE — 85025 COMPLETE CBC W/AUTO DIFF WBC: CPT | Performed by: EMERGENCY MEDICINE

## 2019-10-05 PROCEDURE — 25000125 ZZHC RX 250: Performed by: EMERGENCY MEDICINE

## 2019-10-05 PROCEDURE — 83690 ASSAY OF LIPASE: CPT | Performed by: EMERGENCY MEDICINE

## 2019-10-05 PROCEDURE — 80320 DRUG SCREEN QUANTALCOHOLS: CPT | Performed by: EMERGENCY MEDICINE

## 2019-10-05 PROCEDURE — 71046 X-RAY EXAM CHEST 2 VIEWS: CPT

## 2019-10-05 PROCEDURE — 81001 URINALYSIS AUTO W/SCOPE: CPT | Performed by: EMERGENCY MEDICINE

## 2019-10-05 PROCEDURE — 94640 AIRWAY INHALATION TREATMENT: CPT

## 2019-10-05 PROCEDURE — 99284 EMERGENCY DEPT VISIT MOD MDM: CPT | Mod: 25

## 2019-10-05 RX ORDER — IPRATROPIUM BROMIDE AND ALBUTEROL SULFATE 2.5; .5 MG/3ML; MG/3ML
3 SOLUTION RESPIRATORY (INHALATION)
Status: DISCONTINUED | OUTPATIENT
Start: 2019-10-05 | End: 2019-10-05 | Stop reason: HOSPADM

## 2019-10-05 RX ORDER — ALBUTEROL SULFATE 90 UG/1
2 AEROSOL, METERED RESPIRATORY (INHALATION) EVERY 6 HOURS PRN
Qty: 1 INHALER | Refills: 0 | Status: SHIPPED | OUTPATIENT
Start: 2019-10-05

## 2019-10-05 RX ADMIN — IPRATROPIUM BROMIDE AND ALBUTEROL SULFATE 3 ML: .5; 3 SOLUTION RESPIRATORY (INHALATION) at 18:36

## 2019-10-05 ASSESSMENT — MIFFLIN-ST. JEOR: SCORE: 1955.4

## 2019-10-05 NOTE — LETTER
October 5, 2019      To Whom It May Concern:      Roger Avelar was seen in our Emergency Department today, 10/05/19.  I expect his condition to improve over the next 2 days.  He may return to work when improved.      Sincerely,        Ruiz Arenas RN

## 2019-10-05 NOTE — ED PROVIDER NOTES
"  History     Chief Complaint:  Abdominal Pain and Cough    HPI   Roger Avelar is a 44 year old male with a history of alcohol abuse and ulcerative colitis who presents for evaluation of epigastric pain and a cough. Two days ago, he reports the sudden onset of abdominal pain that has been constant ever since. For about the same amount of time, he has had a cough which has been progressively worsening. He presented to [facility] today and the provider referred him to the ED for a more in depth work up. Here, he reports additional diarrhea and decreased appetite. He denies vomiting, chest pain, or other symptoms. He is more concerned about the abdominal pain. Since the onset of pain, he has been trying to drink about 1.5 gallons of water a day as well. He reports daily alcohol and cigarette use, but he has not used in the last couple of days given his illness. He was drinking approximately 1 pint every day. He denies previous abdominal surgeries.     Allergies:  Penicillins     Medications:    Adderall  Fiorecet  Lexapro  Hydroxyzine   Melatonin    Past Medical History:    Right leg fracture  Alcohol abuse  Tobacco abuse  Ulcerative colitis   Anxiety     Past Surgical History:    Right tibia/fibula ORIF  Hernia repair    Family History:    Father: Cancer, Hypertension   Sister: Ulcerative colitis    Social History:  Marital Status:   [2]  Sister at bedside.   Positive for tobacco use. Comment: 4 cigarettes/day.   Negative for alcohol use. History of abuse.   Negative for drug use.      Review of Systems   All other systems reviewed and are negative.    Physical Exam     Patient Vitals for the past 24 hrs:   BP Temp Temp src Pulse Heart Rate Resp SpO2 Height Weight   10/05/19 1731 (!) 143/97 -- -- -- 71 -- 98 % -- --   10/05/19 1630 (!) 147/106 -- -- -- -- -- -- -- --   10/05/19 1504 (!) 157/123 97.3  F (36.3  C) Temporal 69 -- 22 98 % 1.803 m (5' 11\") 104.3 kg (230 lb)      Physical Exam  General: Resting on " the bed.  Head: No obvious trauma to head.  Ears, Nose, Throat:  External ears normal.  Nose normal.    Eyes:  Conjunctivae clear.  Pupils are equal, round, and reactive.   Neck: Normal range of motion.  Neck supple.   CV: Regular rate and rhythm.  No murmurs.      Respiratory: Effort normal and breath sounds with diffuse wheezing  Gastrointestinal: Soft.  No distension. There is right upper and epigastric tenderness.  There is no rigidity, no rebound and no guarding.   Musculoskeletal: Non tender non edematous calves  Neuro: Alert. Moving all extremities appropriately.  Normal speech.    Skin: Skin is warm and dry.  No rash noted.     Emergency Department Course   Imaging:  Radiographic findings were communicated with the patient who voiced understanding of the findings.  XR Chest 2 views:   Unremarkable chest examination, as per radiology.      Laboratory:  CBC: WBC: 6.3, HGB: 15.8, PLT: 152  CMP: K: 3.3 (L), ALT: 91 (H), AST: 83 (H), o/w WNL (Creatinine: 0.80)  Lipase: 134  Alcohol ethyl: <0.01    UA with Microscopic: Ketones: 80 (A), pH: 7.5 (H), Albumin: 70 (A), Urobilinogen: 3.0 (H), Mucous: Present (A), o/w WNL     Interventions:  1836 Albuterol-Ipratropium 2.5mg-0.5mg/3ml, 3 mL solution, Nebulizer     Emergency Department Course:  Nursing notes and vitals reviewed.   1603: I performed an exam of the patient as documented above.     IV was inserted and blood was drawn for laboratory testing, results above.   The patient was sent for a chest x-ray while in the emergency department, results above.   The patient provided a urine sample here in the emergency department. This was sent for laboratory testing, findings above.     Medicine administered as documented above.     1757: I rechecked the patient and discussed the results of his workup thus far.     1840: Per RN, patient was able to eat a sandwich without worsening of symptoms.     Findings and plan explained to the Patient. Patient discharged home with  instructions regarding supportive care, medications, and reasons to return. The importance of close follow-up was reviewed. The patient was prescribed an albuterol inhaler.      I personally reviewed the laboratory and imaging results with the Patient and answered all related questions prior to discharge.    Impression & Plan      Medical Decision Makin-year-old male with history of alcohol abuse presents with cough and abdominal pain.  Vital signs are largely unremarkable.  Broad differential was pursued including but not limited to electrolyte, metabolic renal dysfunction, hepatitis, pancreatitis, gastritis, perforated ulcer, pneumonia, pneumothorax, effusion, acute cord syndrome, etc.  Overall patient denies any chest pain.  He has reproducible right upper / epigastric abdominal tenderness on examination but no persistent pain.  He was quite dramatic on his initial examination but this resolved without intervention.  He denies any black or bloody stools.  There is no evidence of GI bleed at this time.  CBC shows no leukocytosis or anemia.  BMP shows no acute electrolyte, metabolic or renal dysfunction.  LFTs do show an ALT and AST elevation secondary to patient's alcohol abuse.  Lipase is normal.  No suggestion of obstructive biliary process, hepatitis, pancreatitis.  Transaminasitis suspected secondary to drinking alcohol.  Alcohol level is negative.  UA is unremarkable besides some ketones which is consistent with patient's poor p.o. intake.  Repeat abdominal exam is nonfocal.  Do not feel that emergent imaging of the abdomen is required.  Patient has a benign abdominal exam.  Abdominal pain is likely secondary to patient's coughing, abdominal wall strain.  Chest x-ray was obtained given his cough and shows no acute pneumonia, pneumothorax, effusion.  I do not suspect PE without tachycardia or pleuritic chest pain.  I do not suspect acute coronary syndrome without chest pain and having reproducible  abdominal pain on exam.  I do suspect likely bronchitis in the setting of the wheezing and cough.  Patient was given 2 nebs and felt markedly better.  He felt well enough to go home.  Patient will be discharged with albuterol as needed.  Strict return precautions discussed.  Close follow-up advised.t with plan and discharged in satisfactory condition with all questions answered.      Diagnosis:    ICD-10-CM    1. Bronchitis J40    2. Cough R05    3. Abdominal pain, epigastric R10.13    4. Elevated LFTs R94.5        Disposition:  discharged to home    Discharge Medications:  New Prescriptions    ALBUTEROL (PROAIR HFA/PROVENTIL HFA/VENTOLIN HFA) 108 (90 BASE) MCG/ACT INHALER    Inhale 2 puffs into the lungs every 6 hours as needed for shortness of breath / dyspnea or wheezing       Scribe Disclosure:  I, Rupa Sarah Beth, am serving as a scribe on 10/5/2019 at 4:03 PM to personally document services performed by Mali Meyer MD based on my observations and the provider's statements to me.      10/5/2019   [facility] EMERGENCY DEPARTMENT       Mali Meyer MD  10/05/19 1947

## 2019-10-05 NOTE — ED AVS SNAPSHOT
[facility] Emergency Department  [address]  [facility] [address]  Phone:  [phone]  Fax:  [phone]                                    Roger Avelar   MRN: [id number]    Department:  [facility] Emergency Department   Date of Visit:  10/5/2019           After Visit Summary Signature Page    I have received my discharge instructions, and my questions have been answered. I have discussed any challenges I see with this plan with the nurse or doctor.    ..........................................................................................................................................  Patient/Patient Representative Signature      ..........................................................................................................................................  Patient Representative Print Name and Relationship to Patient    ..................................................               ................................................  Date                                   Time    ..........................................................................................................................................  Reviewed by Signature/Title    ...................................................              ..............................................  Date                                               Time          22EPIC Rev 08/18

## 2019-10-05 NOTE — ED TRIAGE NOTES
Patient complaining of 2 days of upper epigastric pain that he describes as burning and cramping.      Also complaining of 3-4 days of URI symptoms with runny nose and cough.      Recently stopped ETOH.      Seen at [facility] and sent for further eval.

## 2019-10-08 ENCOUNTER — MYC MEDICAL ADVICE (OUTPATIENT)
Dept: PEDIATRICS | Facility: CLINIC | Age: 44
End: 2019-10-08

## 2019-10-09 ENCOUNTER — THERAPY VISIT (OUTPATIENT)
Dept: PHYSICAL THERAPY | Facility: CLINIC | Age: 44
End: 2019-10-09
Payer: COMMERCIAL

## 2019-10-09 DIAGNOSIS — R26.9 ABNORMAL GAIT: ICD-10-CM

## 2019-10-09 DIAGNOSIS — M25.561 ACUTE PAIN OF RIGHT KNEE: ICD-10-CM

## 2019-10-09 DIAGNOSIS — Z47.89 AFTERCARE FOLLOWING SURGERY OF THE MUSCULOSKELETAL SYSTEM: ICD-10-CM

## 2019-10-09 PROCEDURE — 97110 THERAPEUTIC EXERCISES: CPT | Mod: GP | Performed by: PHYSICAL THERAPIST

## 2019-10-09 PROCEDURE — 97530 THERAPEUTIC ACTIVITIES: CPT | Mod: GP | Performed by: PHYSICAL THERAPIST

## 2019-10-23 ENCOUNTER — THERAPY VISIT (OUTPATIENT)
Dept: PHYSICAL THERAPY | Facility: CLINIC | Age: 44
End: 2019-10-23
Payer: COMMERCIAL

## 2019-10-23 DIAGNOSIS — M25.561 ACUTE PAIN OF RIGHT KNEE: ICD-10-CM

## 2019-10-23 DIAGNOSIS — R26.9 ABNORMAL GAIT: ICD-10-CM

## 2019-10-23 DIAGNOSIS — Z47.89 AFTERCARE FOLLOWING SURGERY OF THE MUSCULOSKELETAL SYSTEM: ICD-10-CM

## 2019-10-23 PROCEDURE — 97110 THERAPEUTIC EXERCISES: CPT | Mod: GP | Performed by: PHYSICAL THERAPIST

## 2019-10-23 PROCEDURE — 97530 THERAPEUTIC ACTIVITIES: CPT | Mod: GP | Performed by: PHYSICAL THERAPIST

## 2019-10-30 ENCOUNTER — THERAPY VISIT (OUTPATIENT)
Dept: PHYSICAL THERAPY | Facility: CLINIC | Age: 44
End: 2019-10-30
Payer: COMMERCIAL

## 2019-10-30 DIAGNOSIS — M25.561 ACUTE PAIN OF RIGHT KNEE: ICD-10-CM

## 2019-10-30 DIAGNOSIS — Z47.89 AFTERCARE FOLLOWING SURGERY OF THE MUSCULOSKELETAL SYSTEM: ICD-10-CM

## 2019-10-30 DIAGNOSIS — R26.9 ABNORMAL GAIT: ICD-10-CM

## 2019-10-30 PROCEDURE — 97110 THERAPEUTIC EXERCISES: CPT | Mod: GP | Performed by: PHYSICAL THERAPIST

## 2019-10-30 PROCEDURE — 97116 GAIT TRAINING THERAPY: CPT | Mod: GP | Performed by: PHYSICAL THERAPIST

## 2019-10-30 NOTE — PROGRESS NOTES
Subjective:  HPI                    Objective:  System    Physical Exam    General     ROS    Assessment/Plan:    DISCHARGE REPORT    Progress reporting period is from 08/14/2019 to 10/23/2019.       SUBJECTIVE  Subjective: Patient reports his R knee is doing okay and overall improving.  Pain ranges from 2-7/10.  He is still using his crutch outside of his house but does not use it at home and sometimes not at work.  R leg gets tired easily, and pain increases when he is on it more.  He can do stairs reciprocally if the railing is on the L side, non-reciprocal with a R railing.      Current Pain level: 2/10.     Initial Pain level: 5/10.   Changes in function:  Yes, at times able to ambulate without assistive device, able to negotiate stairs reciprocally with railing.  Adverse reaction to treatment or activity: None    OBJECTIVE  Objective: Gait:  no AD today, continued significant deviation, decreased R stance time, increased lateral sway.  Decreased R knee pain with walking after repeated knee extension exercises.  Continued R quad weakness noted.  Able to negotiate stairs with a reciprocal pattern using a railing, but pattern not smooth.     ASSESSMENT/PLAN  Patient has been seen for 8 visits with treatment focusing on hip and knee strengthening exercises  In addition to gait training.  Progress has been slow and variable throughout treatment.  R knee is exacerbated by being on his feet more, and these exacerbations slow his progress.  He would benefit from additional PT to further progress his strength for improved gait and mobility.    Updated problem list and treatment plan: Diagnosis 1:  S/p R tib/fib fractures with ORIF  Pain -  self management, education, directional preference exercise and home program  Decreased strength - therapeutic exercise, therapeutic activities and home program  Impaired gait - gait training and home program  Impaired muscle performance - neuro re-education and home  program  Decreased function - therapeutic activities and home program  STG/LTGs have been met or progress has been made towards goals:  Yes, STG for stairs has been met, but LTG is unmet due to pain and weakness.    Assessment of Progress: The patient's condition has potential to improve.  Self Management Plans:  Patient has been instructed in a home treatment program.  Patient  has been instructed in self management of symptoms.  I have re-evaluated this patient and find that the nature, scope, duration and intensity of the therapy is appropriate for the medical condition of the patient.  Roger continues to require the following intervention to meet STG and LTG's:  PT    Recommendations:  This patient would benefit from continued therapy.     Frequency:  1 X week, once daily  Duration:  for 8 weeks        Please refer to the daily flowsheet for treatment today, total treatment time and time spent performing 1:1 timed codes.

## 2019-11-06 ENCOUNTER — THERAPY VISIT (OUTPATIENT)
Dept: PHYSICAL THERAPY | Facility: CLINIC | Age: 44
End: 2019-11-06
Payer: COMMERCIAL

## 2019-11-06 DIAGNOSIS — M25.561 ACUTE PAIN OF RIGHT KNEE: ICD-10-CM

## 2019-11-06 DIAGNOSIS — R26.9 ABNORMAL GAIT: ICD-10-CM

## 2019-11-06 DIAGNOSIS — Z47.89 AFTERCARE FOLLOWING SURGERY OF THE MUSCULOSKELETAL SYSTEM: ICD-10-CM

## 2019-11-06 PROCEDURE — 97116 GAIT TRAINING THERAPY: CPT | Mod: GP | Performed by: PHYSICAL THERAPIST

## 2019-11-06 PROCEDURE — 97110 THERAPEUTIC EXERCISES: CPT | Mod: GP | Performed by: PHYSICAL THERAPIST

## 2019-11-06 NOTE — PROGRESS NOTES
Subjective:  HPI                    Objective:  System    Physical Exam    General     ROS    Assessment/Plan:    PROGRESS  REPORT    Progress reporting period is from 10/23/2019 to 11/06/2019.       SUBJECTIVE  Subjective: Patient reports his R knee continues to make slow progress.  He still has good and bad days usually depending on how much he is on his feet.  He is now working part-time as a  in addition to his full-time job at the [employer].  He continues to use 1 crutch due to R knee pain and limping.  Stairs are very difficult and painful, and he usually avoids them if possible.      Current Pain level: 4/10.     Initial Pain level: 5/10.   Changes in function:  Yes, improved walking at times, able to do stairs reciprocally at times.  Adverse reaction to treatment or activity: None    OBJECTIVE  Objective: R knee AROM:  0-134 degrees.  Strength:  flexion 5-/5, extension 4+/5.  Gait:  using 1 crutch, leans to the L, decreased R stance time, decreased heelstrike R>L, decreased venus.       ASSESSMENT/PLAN  Patient has been seen for 12 visits with treatment focusing on hip and knee strengthening exercises to improve gait and mobility.  He has made slow progress throughout treatment, however, progression has been limited by pain with weightbearing on the R.  R knee ROM is good, but strength is limited, and this impacts his gait and mobility.  He would benefit from additional PT to progress strength and improve gait pattern.    Updated problem list and treatment plan: Diagnosis 1:  S/p R tib/fib fx with ORIF  Pain -  self management, education and home program  Decreased strength - therapeutic exercise, therapeutic activities and home program  Impaired gait - gait training and home program  Decreased function - therapeutic activities and home program  STG/LTGs have been met or progress has been made towards goals:  Yes, STG for stairs has been met, but LTG is unmet.  Ambulation goals are  progressing but unmet due to pain and weakness.   Assessment of Progress: The patient's condition is improving.  Self Management Plans:  Patient has been instructed in a home treatment program.  Patient  has been instructed in self management of symptoms.  I have re-evaluated this patient and find that the nature, scope, duration and intensity of the therapy is appropriate for the medical condition of the patient.  Roger continues to require the following intervention to meet STG and LTG's:  PT    Recommendations:  This patient would benefit from continued therapy.     Frequency:  1 X week, once daily  Duration:  for 8 weeks        Please refer to the daily flowsheet for treatment today, total treatment time and time spent performing 1:1 timed codes.

## 2019-11-20 ENCOUNTER — OFFICE VISIT (OUTPATIENT)
Dept: PHYSICAL MEDICINE AND REHAB | Facility: CLINIC | Age: 44
End: 2019-11-20
Payer: COMMERCIAL

## 2019-11-20 ENCOUNTER — THERAPY VISIT (OUTPATIENT)
Dept: PHYSICAL THERAPY | Facility: CLINIC | Age: 44
End: 2019-11-20
Payer: COMMERCIAL

## 2019-11-20 VITALS
DIASTOLIC BLOOD PRESSURE: 103 MMHG | HEART RATE: 66 BPM | SYSTOLIC BLOOD PRESSURE: 162 MMHG | HEIGHT: 71 IN | OXYGEN SATURATION: 97 % | BODY MASS INDEX: 32.2 KG/M2 | WEIGHT: 230 LBS

## 2019-11-20 DIAGNOSIS — S06.0X9D CONCUSSION WITH LOSS OF CONSCIOUSNESS, SUBSEQUENT ENCOUNTER: ICD-10-CM

## 2019-11-20 DIAGNOSIS — R26.9 ABNORMAL GAIT: ICD-10-CM

## 2019-11-20 DIAGNOSIS — Z47.89 AFTERCARE FOLLOWING SURGERY OF THE MUSCULOSKELETAL SYSTEM: ICD-10-CM

## 2019-11-20 DIAGNOSIS — R41.3 MEMORY DEFICIT: ICD-10-CM

## 2019-11-20 DIAGNOSIS — M25.561 ACUTE PAIN OF RIGHT KNEE: ICD-10-CM

## 2019-11-20 DIAGNOSIS — G44.309 POST-CONCUSSION HEADACHE: Primary | ICD-10-CM

## 2019-11-20 PROCEDURE — 97110 THERAPEUTIC EXERCISES: CPT | Mod: GP | Performed by: PHYSICAL THERAPIST

## 2019-11-20 PROCEDURE — 97530 THERAPEUTIC ACTIVITIES: CPT | Mod: GP | Performed by: PHYSICAL THERAPIST

## 2019-11-20 RX ORDER — BUTALBITAL, ACETAMINOPHEN AND CAFFEINE 50; 325; 40 MG/1; MG/1; MG/1
1 TABLET ORAL DAILY PRN
Qty: 14 TABLET | Refills: 0 | Status: SHIPPED | OUTPATIENT
Start: 2019-11-20 | End: 2019-11-20

## 2019-11-20 RX ORDER — PROPRANOLOL HYDROCHLORIDE 60 MG/1
1 TABLET ORAL DAILY
Qty: 30 TABLET | Refills: 1 | Status: SHIPPED | OUTPATIENT
Start: 2019-11-20 | End: 2019-12-20

## 2019-11-20 RX ORDER — BUTALBITAL, ACETAMINOPHEN AND CAFFEINE 50; 325; 40 MG/1; MG/1; MG/1
1 TABLET ORAL DAILY PRN
Qty: 14 TABLET | Refills: 0 | Status: SHIPPED | OUTPATIENT
Start: 2019-11-20

## 2019-11-20 ASSESSMENT — ANXIETY QUESTIONNAIRES
2. NOT BEING ABLE TO STOP OR CONTROL WORRYING: SEVERAL DAYS
5. BEING SO RESTLESS THAT IT IS HARD TO SIT STILL: NOT AT ALL
1. FEELING NERVOUS, ANXIOUS, OR ON EDGE: SEVERAL DAYS
6. BECOMING EASILY ANNOYED OR IRRITABLE: SEVERAL DAYS
IF YOU CHECKED OFF ANY PROBLEMS ON THIS QUESTIONNAIRE, HOW DIFFICULT HAVE THESE PROBLEMS MADE IT FOR YOU TO DO YOUR WORK, TAKE CARE OF THINGS AT HOME, OR GET ALONG WITH OTHER PEOPLE: SOMEWHAT DIFFICULT
3. WORRYING TOO MUCH ABOUT DIFFERENT THINGS: SEVERAL DAYS
GAD7 TOTAL SCORE: 6
7. FEELING AFRAID AS IF SOMETHING AWFUL MIGHT HAPPEN: SEVERAL DAYS

## 2019-11-20 ASSESSMENT — PATIENT HEALTH QUESTIONNAIRE - PHQ9
5. POOR APPETITE OR OVEREATING: SEVERAL DAYS
SUM OF ALL RESPONSES TO PHQ QUESTIONS 1-9: 6

## 2019-11-20 ASSESSMENT — PAIN SCALES - GENERAL: PAINLEVEL: MILD PAIN (3)

## 2019-11-20 ASSESSMENT — MIFFLIN-ST. JEOR: SCORE: 1955.4
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
## 2019-11-21 ASSESSMENT — ANXIETY QUESTIONNAIRES: GAD7 TOTAL SCORE: 6
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
## 2020-04-14 NOTE — PROGRESS NOTES
Subjective:  HPI  Physical Exam                    Objective:  System    Physical Exam    General     ROS    Assessment/Plan:    DISCHARGE REPORT    Progress reporting period is from 11/06/2019 to 11/20/2019.       SUBJECTIVE  Subjective: When last seen on 11/20/2019, patient reports he had seen his MD who had done x-rays and said the screws in his knee were breaking, but this should not impact things unless the nilo shifts.  He was scheduled to see his MD again in January to re-check.  He reported his R knee was doing a little better overall.  He still had significant difficulty and pain with stairs and avoided them if possible.  He was still using 1 crutch for walking but sometimes did not use it.  Current status is unknown as patient did not return for additional follow-up visits.  Current Pain level:  As of 11/20/2019:  4/10.     Initial Pain level: 5/10.   Changes in function:  Unknown as patient did not return for additional follow-up visits.  Adverse reaction to treatment or activity: Unknown as patient did not return for additional follow-up visits.    OBJECTIVE  Objective: As of 11/20/2019:  Gait:  improved pattern overall, 1 crutch--deviation due to lean to the L, decreased R stance time, decreased venus.     Current objective measurements are unavailable as patient did not return for additional follow-up visits.    ASSESSMENT/PLAN  Patient was seen for 11 visits with treatment focusing on R knee and hip strengthening exercises.  Progress was slow throughout treatment, and pain limited progress with gait and mobility.  He continued to demonstrate a significant gait deviation at his last visit even with use of a crutch.  He has not returned for additional follow-up visits so current assessment is unavailable.  Updated problem list and treatment plan: Diagnosis 1:  S/p R tib/fib fractures with ORIF     No updated problem list or treatment plan as patient did not return for additional visits and is  discharged from PT at this time.    STG/LTGs have been met or progress has been made towards goals:  Unknown as patient did not return for additional follow-up visits.  Assessment of Progress: Patient has not returned to therapy.  Current status is unknown and discharge G code cannot be reported.  Self Management Plans:  Patient has been instructed in a home treatment program.  Patient  has been instructed in self management of symptoms.  Roger continues to require the following intervention to meet STG and LTG's:  PT intervention is no longer required to meet STG/LTG.    Recommendations:  Patient is discharged from PT as he did not return for further follow-up visits.    Please refer to the daily flowsheet for treatment today, total treatment time and time spent performing 1:1 timed codes.

## 2020-07-06 NOTE — DISCHARGE INSTRUCTIONS
May use inhalers every 4-6 hours as awake.  If have retractions, increased work of breathing, color changes, severe shortness of breath or other concerns return to the ED.  Please have your PCP recheck in 1-2 days.      Discharge Instructions  Bronchitis, Pneumonia, Bronchospasm    You were seen today for a chest infection or inflammation. If your provider decided this was due to a bacterial infection, you may need an antibiotic. Sometimes these are caused by a virus, and then an antibiotic will not help.     Generally, every Emergency Department visit should have a follow-up clinic visit with either a primary or a specialty clinic/provider. Please follow-up as instructed by your emergency provider today.    Return to the Emergency Department if:  Your breathing gets much worse.  You are very weak, or feel much more ill.  You develop new symptoms, such as chest pain.  You cough up blood.  You are vomiting (throwing up) enough that you cannot keep fluids or your medicine down.    What can I do to help myself?  Fill any prescriptions the provider gave you and take them right away--especially antibiotics. Be sure to finish the whole antibiotic prescription.  You may be given a prescription for an inhaler, which can help loosen tight air passages.  Use this as needed, but not more often than directed. Inhalers work much better when used with a spacer.   You may be given a prescription for a steroid to reduce inflammation. Used long-term, these can have side effects, but for short-term use they are safe. You may notice restlessness or increased appetite.      You may use non-prescription cough or cold medicines. Cough medicines may help, but don t make the cough go away completely.   Avoid smoke, because this can make your symptoms worse. If you smoke, this may be a good time to quit! Consider using nicotine lozenges, gum, or patches to reduce cravings.   If you have a fever, Tylenol  (acetaminophen), Motrin   (ibuprofen), or Advil  (ibuprofen) may help bring fever down and may help you feel more comfortable. Be sure to read and follow the package directions, and ask your provider if you have questions.  Be sure to get your flu shot each year.  For certain ages, the pneumonia shot can help prevent pneumonia.  If you were given a prescription for medicine here today, be sure to read all of the information (including the package insert) that comes with your prescription.  This will include important information about the medicine, its side effects, and any warnings that you need to know about.  The pharmacist who fills the prescription can provide more information and answer questions you may have about the medicine.  If you have questions or concerns that the pharmacist cannot address, please call or return to the Emergency Department.     Remember that you can always come back to the Emergency Department if you are not able to see your regular provider in the amount of time listed above, if you get any new symptoms, or if there is anything that worries you.       Negative

## 2021-01-09 ENCOUNTER — HEALTH MAINTENANCE LETTER (OUTPATIENT)
Age: 46
End: 2021-01-09

## 2021-03-08 ENCOUNTER — TRANSFERRED RECORDS (OUTPATIENT)
Dept: HEALTH INFORMATION MANAGEMENT | Facility: CLINIC | Age: 46
End: 2021-03-08

## 2021-10-23 ENCOUNTER — HEALTH MAINTENANCE LETTER (OUTPATIENT)
Age: 46
End: 2021-10-23

## 2022-02-12 ENCOUNTER — HEALTH MAINTENANCE LETTER (OUTPATIENT)
Age: 47
End: 2022-02-12

## 2022-10-09 ENCOUNTER — HEALTH MAINTENANCE LETTER (OUTPATIENT)
Age: 47
End: 2022-10-09

## 2023-03-25 ENCOUNTER — HEALTH MAINTENANCE LETTER (OUTPATIENT)
Age: 48
End: 2023-03-25